# Patient Record
Sex: FEMALE | Race: WHITE | Employment: FULL TIME | URBAN - METROPOLITAN AREA
[De-identification: names, ages, dates, MRNs, and addresses within clinical notes are randomized per-mention and may not be internally consistent; named-entity substitution may affect disease eponyms.]

---

## 2017-01-03 ENCOUNTER — PRENATAL OFFICE VISIT (OUTPATIENT)
Dept: FAMILY MEDICINE | Facility: CLINIC | Age: 37
End: 2017-01-03
Payer: COMMERCIAL

## 2017-01-03 VITALS
HEART RATE: 72 BPM | SYSTOLIC BLOOD PRESSURE: 118 MMHG | HEIGHT: 66 IN | TEMPERATURE: 97.8 F | BODY MASS INDEX: 28.12 KG/M2 | DIASTOLIC BLOOD PRESSURE: 68 MMHG | WEIGHT: 175 LBS

## 2017-01-03 DIAGNOSIS — Z34.83 PRENATAL CARE, SUBSEQUENT PREGNANCY, THIRD TRIMESTER: Primary | ICD-10-CM

## 2017-01-03 PROCEDURE — 99207 ZZC PRENATAL VISIT: CPT | Performed by: FAMILY MEDICINE

## 2017-01-03 PROCEDURE — 87653 STREP B DNA AMP PROBE: CPT | Mod: 90 | Performed by: FAMILY MEDICINE

## 2017-01-03 PROCEDURE — 99000 SPECIMEN HANDLING OFFICE-LAB: CPT | Performed by: FAMILY MEDICINE

## 2017-01-03 NOTE — PROGRESS NOTES
Doing well with no concerns other than noting slight ankle swelling at the end of the day. No vision changes no abd pain and no headaches  No ctx no LOF she feels good. On valtrex and no labial lesions or sxs. Reviewed when to go to labor and delivery. GBS done today. Will see OB next visit.

## 2017-01-03 NOTE — MR AVS SNAPSHOT
After Visit Summary   1/3/2017    Judi Wayne    MRN: [id number]           Patient Information     Date Of Birth          1980        Visit Information        Provider Department      1/3/2017 9:20 AM Vanessa Mackay MD [facility]        Care Instructions    Recheck in one week with Dr Valle     Go to labor and delivery with any signs of labor or leakage of fluid         Follow-ups after your visit        Your next 10 appointments already scheduled     Jan 12, 2017  1:30 PM   ESTABLISHED PRENATAL with Thania Valle MD   [facility] ([facility])    [address] [place] [address]   [phone]              Who to contact     If you have questions or need follow up information about today's clinic visit or your schedule please contact [facility] directly at [phone].  Normal or non-critical lab and imaging results will be communicated to you by MyChart, letter or phone within 4 business days after the clinic has received the results. If you do not hear from us within 7 days, please contact the clinic through [employer] or phone. If you have a critical or abnormal lab result, we will notify you by phone as soon as possible.  Submit refill requests through [employer] or call your pharmacy and they will forward the refill request to us. Please allow 3 business days for your refill to be completed.          Additional Information About Your Visit        MyChart Information     [employer] gives you secure access to your electronic health record. If you see a primary care provider, you can also send messages to your care team and make appointments. If you have questions, please call your primary care clinic.  If you do not have a primary care provider, please call [phone] and they will assist you.        Care EveryWhere ID     This is your Care EveryWhere ID. This could be used by other organizations to  "access your [facility] medical records  [id number]        Your Vitals Were     Pulse Temperature Height BMI (Body Mass Index) Last Period       72 97.8  F (36.6  C) (Tympanic) 5' 6\" (1.676 m) 28.26 kg/m2 04/29/2016        Blood Pressure from Last 3 Encounters:   01/03/17 118/68   12/19/16 102/58   12/07/16 125/89    Weight from Last 3 Encounters:   01/03/17 175 lb (79.379 kg)   12/19/16 170 lb 6.4 oz (77.293 kg)   12/07/16 167 lb (75.751 kg)              Today, you had the following     No orders found for display       Primary Care Provider Office Phone # Fax #    Vanessa Mackay [phone]       [facility] [address] [facility] [address]        Thank you!     Thank you for choosing [facility]  for your care. Our goal is always to provide you with excellent care. Hearing back from our patients is one way we can continue to improve our services. Please take a few minutes to complete the written survey that you may receive in the mail after your visit with us. Thank you!             Your Updated Medication List - Protect others around you: Learn how to safely use, store and throw away your medicines at www.disposemymeds.org.          This list is accurate as of: 1/3/17 10:02 AM.  Always use your most recent med list.                   Brand Name Dispense Instructions for use    COLACE PO          ferrous sulfate 325 (65 FE) MG Tbec EC tablet     30 tablet    Take 1 tablet (325 mg) by mouth daily       prenatal multivitamin  plus iron 27-0.8 MG Tabs per tablet      Take 1 tablet by mouth daily       valACYclovir 500 MG tablet    VALTREX    90 tablet    Take 1 tablet (500 mg) by mouth daily         "

## 2017-01-03 NOTE — PATIENT INSTRUCTIONS
Recheck in one week with Dr Valle     Go to labor and delivery with any signs of labor or leakage of fluid

## 2017-01-04 LAB
GP B STREP DNA SPEC QL NAA+PROBE: NORMAL
SPECIMEN SOURCE: NORMAL

## 2017-01-12 ENCOUNTER — MYC MEDICAL ADVICE (OUTPATIENT)
Dept: FAMILY MEDICINE | Facility: CLINIC | Age: 37
End: 2017-01-12

## 2017-01-13 ENCOUNTER — TELEPHONE (OUTPATIENT)
Dept: FAMILY MEDICINE | Facility: CLINIC | Age: 37
End: 2017-01-13

## 2017-01-13 ENCOUNTER — PRENATAL OFFICE VISIT (OUTPATIENT)
Dept: OBGYN | Facility: CLINIC | Age: 37
End: 2017-01-13
Payer: COMMERCIAL

## 2017-01-13 VITALS
HEIGHT: 66 IN | WEIGHT: 176 LBS | DIASTOLIC BLOOD PRESSURE: 84 MMHG | BODY MASS INDEX: 28.28 KG/M2 | HEART RATE: 74 BPM | SYSTOLIC BLOOD PRESSURE: 131 MMHG

## 2017-01-13 DIAGNOSIS — Z34.83 PRENATAL CARE, SUBSEQUENT PREGNANCY, THIRD TRIMESTER: Primary | ICD-10-CM

## 2017-01-13 PROBLEM — Z28.39 RUBELLA NON-IMMUNE STATUS, ANTEPARTUM: Status: ACTIVE | Noted: 2017-01-13

## 2017-01-13 PROBLEM — O09.899 RUBELLA NON-IMMUNE STATUS, ANTEPARTUM: Status: ACTIVE | Noted: 2017-01-13

## 2017-01-13 PROCEDURE — 99207 ZZC PRENATAL VISIT: CPT | Performed by: OBSTETRICS & GYNECOLOGY

## 2017-01-13 NOTE — NURSING NOTE
"Chief Complaint   Patient presents with     Prenatal Care       Initial /84 mmHg  Pulse 74  Ht 5' 6\" (1.676 m)  Wt 176 lb (79.833 kg)  BMI 28.42 kg/m2  LMP 04/29/2016  Breastfeeding? No Estimated body mass index is 28.42 kg/(m^2) as calculated from the following:    Height as of this encounter: 5' 6\" (1.676 m).    Weight as of this encounter: 176 lb (79.833 kg).  BP completed using cuff size: tala Mederos      "

## 2017-01-13 NOTE — PROGRESS NOTES
"Doing well.   C/o of hip discomfort intermittent and mild contractions. Tolerable for her.   Denies VB, LOF. +FM  /84 mmHg  Pulse 74  Ht 5' 6\" (1.676 m)  Wt 176 lb (79.833 kg)  BMI 28.42 kg/m2  LMP 2016  Breastfeeding? No  General Appearance: NAD  Abdomen: Gravid, NT  Refer to flow sheet above.   A/P: 36 year old  at 37w0d  -- reviewed GBS negative status  -- Discussed with Judi Wayne, the following; indications; the agents and methods of labor augmentation, including risks, benefits, and alternative approaches; and the possible need for  birth. EFW is SGA. The Labor Induction:what you need to know information sheet was made available to her. Questions and concerns were addressed and patient agrees to above if necessary during the course of her labor.  -- labor precautions reviewed  RTC in 1 week    Rashaad Marshall MD  [facility]            "

## 2017-01-19 ENCOUNTER — PRENATAL OFFICE VISIT (OUTPATIENT)
Dept: OBGYN | Facility: CLINIC | Age: 37
End: 2017-01-19
Payer: COMMERCIAL

## 2017-01-19 VITALS
DIASTOLIC BLOOD PRESSURE: 66 MMHG | SYSTOLIC BLOOD PRESSURE: 119 MMHG | BODY MASS INDEX: 28.77 KG/M2 | HEIGHT: 66 IN | HEART RATE: 77 BPM | WEIGHT: 179 LBS

## 2017-01-19 DIAGNOSIS — O09.899 RUBELLA NON-IMMUNE STATUS, ANTEPARTUM: ICD-10-CM

## 2017-01-19 DIAGNOSIS — Z34.83 PRENATAL CARE, SUBSEQUENT PREGNANCY, THIRD TRIMESTER: Primary | ICD-10-CM

## 2017-01-19 DIAGNOSIS — Z28.39 RUBELLA NON-IMMUNE STATUS, ANTEPARTUM: ICD-10-CM

## 2017-01-19 PROCEDURE — 99207 ZZC PRENATAL VISIT: CPT | Performed by: OBSTETRICS & GYNECOLOGY

## 2017-01-19 NOTE — PROGRESS NOTES
"Doing well.   Reports occasional ctx. Uncertain about elective induction of labor as her  is against it.    Denies VB, LOF. +FM  /66 mmHg  Pulse 77  Ht 5' 6\" (1.676 m)  Wt 179 lb (81.194 kg)  BMI 28.91 kg/m2  LMP 2016  Breastfeeding? No  General Appearance: NAD  Abdomen: Gravid, NT  Refer to flow sheet above.   A/P: 36 year old  at 37w6d  -- GBS negative status reviewed  -- letter provided regarding delaying her short-term disability until delivery  -- Discussed with Judi Wayne, the following; indications; the agents and methods of labor augmentation, including risks, benefits, and alternative approaches; and the possible need for  birth. EFW is AGA. The Labor Induction:what you need to know information sheet was made available to her. Questions and concerns were addressed and patient agrees to above if necessary during the course of her labor.  -- labor precautions reviewed  RTC in 1 week    Rashaad Marshall MD  [facility]            "

## 2017-01-19 NOTE — NURSING NOTE
"Chief Complaint   Patient presents with     Prenatal Care       Initial /66 mmHg  Pulse 77  Ht 5' 6\" (1.676 m)  Wt 179 lb (81.194 kg)  BMI 28.91 kg/m2  LMP 04/29/2016  Breastfeeding? No Estimated body mass index is 28.91 kg/(m^2) as calculated from the following:    Height as of this encounter: 5' 6\" (1.676 m).    Weight as of this encounter: 179 lb (81.194 kg).  BP completed using cuff size: regular  Emily Mederos      "

## 2017-01-19 NOTE — Clinical Note
[facility]  [address] [facility] [address]  Phone: [phone]    January 19, 2017    Judi HOOD Tone  [address] [place] [id number]              To whom it may concern;      Judi would like to postpone the start of her short-term disability while she is still able to work.            Sincerely,      Rashaad Marshall MD/ dl

## 2017-01-23 ENCOUNTER — HOSPITAL ENCOUNTER (INPATIENT)
Facility: CLINIC | Age: 37
LOS: 1 days | Discharge: HOME | End: 2017-01-24
Attending: OBSTETRICS & GYNECOLOGY | Admitting: OBSTETRICS & GYNECOLOGY
Payer: COMMERCIAL

## 2017-01-23 ENCOUNTER — ANESTHESIA EVENT (OUTPATIENT)
Dept: OBGYN | Facility: CLINIC | Age: 37
End: 2017-01-23
Payer: COMMERCIAL

## 2017-01-23 ENCOUNTER — ANESTHESIA (OUTPATIENT)
Dept: OBGYN | Facility: CLINIC | Age: 37
End: 2017-01-23
Payer: COMMERCIAL

## 2017-01-23 LAB
ABO + RH BLD: NORMAL
ABO + RH BLD: NORMAL
SPECIMEN EXP DATE BLD: NORMAL
T PALLIDUM IGG+IGM SER QL: NEGATIVE

## 2017-01-23 PROCEDURE — 25000125 ZZHC RX 250: Performed by: NURSE ANESTHETIST, CERTIFIED REGISTERED

## 2017-01-23 PROCEDURE — 86901 BLOOD TYPING SEROLOGIC RH(D): CPT | Performed by: OBSTETRICS & GYNECOLOGY

## 2017-01-23 PROCEDURE — 25000128 H RX IP 250 OP 636: Performed by: NURSE ANESTHETIST, CERTIFIED REGISTERED

## 2017-01-23 PROCEDURE — 37000011 ZZH ANESTHESIA WARD SERVICE: Performed by: NURSE ANESTHETIST, CERTIFIED REGISTERED

## 2017-01-23 PROCEDURE — 72200001 ZZH LABOR CARE VAGINAL DELIVERY SINGLE

## 2017-01-23 PROCEDURE — 3E0R3CZ INTRODUCTION OF REGIONAL ANESTHETIC INTO SPINAL CANAL, PERCUTANEOUS APPROACH: ICD-10-PCS | Performed by: OBSTETRICS & GYNECOLOGY

## 2017-01-23 PROCEDURE — 86780 TREPONEMA PALLIDUM: CPT | Performed by: OBSTETRICS & GYNECOLOGY

## 2017-01-23 PROCEDURE — 25000132 ZZH RX MED GY IP 250 OP 250 PS 637: Performed by: OBSTETRICS & GYNECOLOGY

## 2017-01-23 PROCEDURE — 86900 BLOOD TYPING SEROLOGIC ABO: CPT | Performed by: OBSTETRICS & GYNECOLOGY

## 2017-01-23 PROCEDURE — 40000671 ZZH STATISTIC ANESTHESIA CASE

## 2017-01-23 PROCEDURE — 00HU33Z INSERTION OF INFUSION DEVICE INTO SPINAL CANAL, PERCUTANEOUS APPROACH: ICD-10-PCS | Performed by: OBSTETRICS & GYNECOLOGY

## 2017-01-23 PROCEDURE — 25800025 ZZH RX 258: Performed by: OBSTETRICS & GYNECOLOGY

## 2017-01-23 PROCEDURE — 25000125 ZZHC RX 250: Performed by: OBSTETRICS & GYNECOLOGY

## 2017-01-23 PROCEDURE — 59400 OBSTETRICAL CARE: CPT | Performed by: OBSTETRICS & GYNECOLOGY

## 2017-01-23 PROCEDURE — 10907ZC DRAINAGE OF AMNIOTIC FLUID, THERAPEUTIC FROM PRODUCTS OF CONCEPTION, VIA NATURAL OR ARTIFICIAL OPENING: ICD-10-PCS | Performed by: OBSTETRICS & GYNECOLOGY

## 2017-01-23 PROCEDURE — 12000027 ZZH R&B OB

## 2017-01-23 RX ORDER — OXYTOCIN/0.9 % SODIUM CHLORIDE 30/500 ML
100 PLASTIC BAG, INJECTION (ML) INTRAVENOUS CONTINUOUS
Status: DISCONTINUED | OUTPATIENT
Start: 2017-01-23 | End: 2017-01-24 | Stop reason: HOSPADM

## 2017-01-23 RX ORDER — METHYLERGONOVINE MALEATE 0.2 MG/ML
200 INJECTION INTRAVENOUS
Status: DISCONTINUED | OUTPATIENT
Start: 2017-01-23 | End: 2017-01-23

## 2017-01-23 RX ORDER — NALOXONE HYDROCHLORIDE 0.4 MG/ML
.1-.4 INJECTION, SOLUTION INTRAMUSCULAR; INTRAVENOUS; SUBCUTANEOUS
Status: DISCONTINUED | OUTPATIENT
Start: 2017-01-23 | End: 2017-01-24 | Stop reason: HOSPADM

## 2017-01-23 RX ORDER — EPHEDRINE SULFATE 50 MG/ML
5 INJECTION, SOLUTION INTRAMUSCULAR; INTRAVENOUS; SUBCUTANEOUS
Status: DISCONTINUED | OUTPATIENT
Start: 2017-01-23 | End: 2017-01-24 | Stop reason: HOSPADM

## 2017-01-23 RX ORDER — OXYTOCIN/0.9 % SODIUM CHLORIDE 30/500 ML
100-340 PLASTIC BAG, INJECTION (ML) INTRAVENOUS CONTINUOUS PRN
Status: COMPLETED | OUTPATIENT
Start: 2017-01-23 | End: 2017-01-23

## 2017-01-23 RX ORDER — BISACODYL 10 MG
10 SUPPOSITORY, RECTAL RECTAL DAILY PRN
Status: DISCONTINUED | OUTPATIENT
Start: 2017-01-25 | End: 2017-01-24 | Stop reason: HOSPADM

## 2017-01-23 RX ORDER — FENTANYL CITRATE 50 UG/ML
INJECTION, SOLUTION INTRAMUSCULAR; INTRAVENOUS PRN
Status: DISCONTINUED | OUTPATIENT
Start: 2017-01-23 | End: 2017-01-23

## 2017-01-23 RX ORDER — HYDROCODONE BITARTRATE AND ACETAMINOPHEN 5; 325 MG/1; MG/1
1-2 TABLET ORAL EVERY 4 HOURS PRN
Status: DISCONTINUED | OUTPATIENT
Start: 2017-01-23 | End: 2017-01-24 | Stop reason: HOSPADM

## 2017-01-23 RX ORDER — ONDANSETRON 2 MG/ML
4 INJECTION INTRAMUSCULAR; INTRAVENOUS EVERY 6 HOURS PRN
Status: DISCONTINUED | OUTPATIENT
Start: 2017-01-23 | End: 2017-01-23 | Stop reason: CLARIF

## 2017-01-23 RX ORDER — SODIUM CHLORIDE, SODIUM LACTATE, POTASSIUM CHLORIDE, CALCIUM CHLORIDE 600; 310; 30; 20 MG/100ML; MG/100ML; MG/100ML; MG/100ML
INJECTION, SOLUTION INTRAVENOUS CONTINUOUS
Status: DISCONTINUED | OUTPATIENT
Start: 2017-01-23 | End: 2017-01-23 | Stop reason: CLARIF

## 2017-01-23 RX ORDER — IBUPROFEN 400 MG/1
400-800 TABLET, FILM COATED ORAL EVERY 6 HOURS PRN
Status: DISCONTINUED | OUTPATIENT
Start: 2017-01-23 | End: 2017-01-24 | Stop reason: HOSPADM

## 2017-01-23 RX ORDER — OXYTOCIN/0.9 % SODIUM CHLORIDE 30/500 ML
340 PLASTIC BAG, INJECTION (ML) INTRAVENOUS CONTINUOUS PRN
Status: DISCONTINUED | OUTPATIENT
Start: 2017-01-23 | End: 2017-01-24 | Stop reason: HOSPADM

## 2017-01-23 RX ORDER — PRENATAL VIT/IRON FUM/FOLIC AC 27MG-0.8MG
1 TABLET ORAL DAILY
Status: DISCONTINUED | OUTPATIENT
Start: 2017-01-24 | End: 2017-01-24 | Stop reason: HOSPADM

## 2017-01-23 RX ORDER — LANOLIN 100 %
OINTMENT (GRAM) TOPICAL
Status: DISCONTINUED | OUTPATIENT
Start: 2017-01-23 | End: 2017-01-24 | Stop reason: HOSPADM

## 2017-01-23 RX ORDER — LIDOCAINE HYDROCHLORIDE 10 MG/ML
INJECTION, SOLUTION EPIDURAL; INFILTRATION; INTRACAUDAL; PERINEURAL
Status: DISCONTINUED
Start: 2017-01-23 | End: 2017-01-23 | Stop reason: HOSPADM

## 2017-01-23 RX ORDER — ONDANSETRON 2 MG/ML
4 INJECTION INTRAMUSCULAR; INTRAVENOUS EVERY 6 HOURS PRN
Status: DISCONTINUED | OUTPATIENT
Start: 2017-01-23 | End: 2017-01-23

## 2017-01-23 RX ORDER — LIDOCAINE HYDROCHLORIDE 10 MG/ML
INJECTION, SOLUTION INFILTRATION; PERINEURAL PRN
Status: DISCONTINUED | OUTPATIENT
Start: 2017-01-23 | End: 2017-01-23

## 2017-01-23 RX ORDER — LIDOCAINE HYDROCHLORIDE AND EPINEPHRINE 15; 5 MG/ML; UG/ML
INJECTION, SOLUTION EPIDURAL PRN
Status: DISCONTINUED | OUTPATIENT
Start: 2017-01-23 | End: 2017-01-23

## 2017-01-23 RX ORDER — HYDROCORTISONE 2.5 %
CREAM (GRAM) TOPICAL 3 TIMES DAILY PRN
Status: DISCONTINUED | OUTPATIENT
Start: 2017-01-23 | End: 2017-01-24 | Stop reason: HOSPADM

## 2017-01-23 RX ORDER — NALOXONE HYDROCHLORIDE 0.4 MG/ML
.1-.4 INJECTION, SOLUTION INTRAMUSCULAR; INTRAVENOUS; SUBCUTANEOUS
Status: DISCONTINUED | OUTPATIENT
Start: 2017-01-23 | End: 2017-01-23

## 2017-01-23 RX ORDER — MISOPROSTOL 200 UG/1
400 TABLET ORAL
Status: DISCONTINUED | OUTPATIENT
Start: 2017-01-23 | End: 2017-01-24 | Stop reason: HOSPADM

## 2017-01-23 RX ORDER — FENTANYL CITRATE 50 UG/ML
INJECTION, SOLUTION INTRAMUSCULAR; INTRAVENOUS
Status: DISCONTINUED
Start: 2017-01-23 | End: 2017-01-23 | Stop reason: HOSPADM

## 2017-01-23 RX ORDER — SODIUM CHLORIDE, SODIUM LACTATE, POTASSIUM CHLORIDE, CALCIUM CHLORIDE 600; 310; 30; 20 MG/100ML; MG/100ML; MG/100ML; MG/100ML
INJECTION, SOLUTION INTRAVENOUS CONTINUOUS
Status: DISCONTINUED | OUTPATIENT
Start: 2017-01-23 | End: 2017-01-23

## 2017-01-23 RX ORDER — OXYTOCIN 10 [USP'U]/ML
10 INJECTION, SOLUTION INTRAMUSCULAR; INTRAVENOUS
Status: DISCONTINUED | OUTPATIENT
Start: 2017-01-23 | End: 2017-01-24 | Stop reason: HOSPADM

## 2017-01-23 RX ORDER — ONDANSETRON 2 MG/ML
4 INJECTION INTRAMUSCULAR; INTRAVENOUS EVERY 6 HOURS PRN
Status: DISCONTINUED | OUTPATIENT
Start: 2017-01-23 | End: 2017-01-24 | Stop reason: HOSPADM

## 2017-01-23 RX ORDER — ACETAMINOPHEN 325 MG/1
650 TABLET ORAL EVERY 4 HOURS PRN
Status: DISCONTINUED | OUTPATIENT
Start: 2017-01-23 | End: 2017-01-23

## 2017-01-23 RX ORDER — AMOXICILLIN 250 MG
1-2 CAPSULE ORAL 2 TIMES DAILY
Status: DISCONTINUED | OUTPATIENT
Start: 2017-01-23 | End: 2017-01-24 | Stop reason: HOSPADM

## 2017-01-23 RX ORDER — CARBOPROST TROMETHAMINE 250 UG/ML
250 INJECTION, SOLUTION INTRAMUSCULAR
Status: DISCONTINUED | OUTPATIENT
Start: 2017-01-23 | End: 2017-01-23

## 2017-01-23 RX ORDER — ROPIVACAINE HYDROCHLORIDE 2 MG/ML
INJECTION, SOLUTION EPIDURAL; INFILTRATION; PERINEURAL PRN
Status: DISCONTINUED | OUTPATIENT
Start: 2017-01-23 | End: 2017-01-23

## 2017-01-23 RX ORDER — ONDANSETRON 4 MG/1
4 TABLET, ORALLY DISINTEGRATING ORAL EVERY 6 HOURS PRN
Status: DISCONTINUED | OUTPATIENT
Start: 2017-01-23 | End: 2017-01-24 | Stop reason: HOSPADM

## 2017-01-23 RX ORDER — OXYCODONE AND ACETAMINOPHEN 5; 325 MG/1; MG/1
1 TABLET ORAL
Status: DISCONTINUED | OUTPATIENT
Start: 2017-01-23 | End: 2017-01-23

## 2017-01-23 RX ORDER — ACETAMINOPHEN 325 MG/1
650 TABLET ORAL EVERY 4 HOURS PRN
Status: DISCONTINUED | OUTPATIENT
Start: 2017-01-23 | End: 2017-01-24 | Stop reason: HOSPADM

## 2017-01-23 RX ORDER — DIPHENHYDRAMINE HYDROCHLORIDE 50 MG/ML
12.5-25 INJECTION INTRAMUSCULAR; INTRAVENOUS EVERY 4 HOURS PRN
Status: DISCONTINUED | OUTPATIENT
Start: 2017-01-23 | End: 2017-01-24 | Stop reason: HOSPADM

## 2017-01-23 RX ORDER — OXYTOCIN 10 [USP'U]/ML
10 INJECTION, SOLUTION INTRAMUSCULAR; INTRAVENOUS
Status: DISCONTINUED | OUTPATIENT
Start: 2017-01-23 | End: 2017-01-23

## 2017-01-23 RX ORDER — ROPIVACAINE HYDROCHLORIDE 2 MG/ML
10 INJECTION, SOLUTION EPIDURAL; INFILTRATION; PERINEURAL ONCE
Status: DISCONTINUED | OUTPATIENT
Start: 2017-01-23 | End: 2017-01-24 | Stop reason: HOSPADM

## 2017-01-23 RX ORDER — LIDOCAINE HCL/EPINEPHRINE/PF 2%-1:200K
VIAL (ML) INJECTION PRN
Status: DISCONTINUED | OUTPATIENT
Start: 2017-01-23 | End: 2017-01-23

## 2017-01-23 RX ORDER — IBUPROFEN 800 MG/1
800 TABLET, FILM COATED ORAL
Status: COMPLETED | OUTPATIENT
Start: 2017-01-23 | End: 2017-01-23

## 2017-01-23 RX ADMIN — Medication 10 ML/HR: at 08:26

## 2017-01-23 RX ADMIN — Medication 10 ML: at 08:26

## 2017-01-23 RX ADMIN — SODIUM CHLORIDE, POTASSIUM CHLORIDE, SODIUM LACTATE AND CALCIUM CHLORIDE 1000 ML: 600; 310; 30; 20 INJECTION, SOLUTION INTRAVENOUS at 09:00

## 2017-01-23 RX ADMIN — SODIUM CHLORIDE, POTASSIUM CHLORIDE, SODIUM LACTATE AND CALCIUM CHLORIDE 1000 ML: 600; 310; 30; 20 INJECTION, SOLUTION INTRAVENOUS at 07:40

## 2017-01-23 RX ADMIN — SODIUM BICARBONATE 0.8 MEQ: 84 INJECTION, SOLUTION INTRAVENOUS at 08:08

## 2017-01-23 RX ADMIN — OXYTOCIN-SODIUM CHLORIDE 0.9% IV SOLN 30 UNIT/500ML 340 ML/HR: 30-0.9/5 SOLUTION at 10:42

## 2017-01-23 RX ADMIN — LIDOCAINE HYDROCHLORIDE,EPINEPHRINE BITARTRATE 45 MG: 15; .005 INJECTION, SOLUTION EPIDURAL; INFILTRATION; INTRACAUDAL; PERINEURAL at 08:20

## 2017-01-23 RX ADMIN — LIDOCAINE HYDROCHLORIDE,EPINEPHRINE BITARTRATE 10 ML: 20; .005 INJECTION, SOLUTION EPIDURAL; INFILTRATION; INTRACAUDAL; PERINEURAL at 09:39

## 2017-01-23 RX ADMIN — IBUPROFEN 800 MG: 800 TABLET ORAL at 19:04

## 2017-01-23 RX ADMIN — LIDOCAINE HYDROCHLORIDE 80 MG: 10 INJECTION, SOLUTION INFILTRATION; PERINEURAL at 08:08

## 2017-01-23 RX ADMIN — FENTANYL CITRATE 100 MCG: 50 INJECTION, SOLUTION INTRAMUSCULAR; INTRAVENOUS at 08:26

## 2017-01-23 ASSESSMENT — LIFESTYLE VARIABLES: TOBACCO_USE: 1

## 2017-01-23 NOTE — L&D DELIVERY NOTE
"Delivery Summary    36 year old  38w3d presents for active labor  GBS negative status  Cx on admission 4/100/-1  S/p epidural  AROM at 0910,  with blood clear fluid  Category 2 tracing with deep variable to 70 from baseline of 130-140s, moderate variability  Progressed to complete at      at 1040,   Viable female infant, delivered  Weight 6#1oz  Apgar 9 and 9 at 1 and 5 minutes, respectively  Placenta delivered intact, 3 vessel cord  No lacerations  QBL pending  \"\"    Rashaad Marshall MD  [facility]      Judi Wayne MRN# [id number]   Age: 36 year old YOB: 1980     Labor Event Times:    Labor Onset Date 2017       Labor Onset Time 4:00 AM    Dilation Complete Date 2017    Dilation Complete Time 10:29 AM       Start Pushing Date         Start Pushing Time             Labor Length:    1st Stage (hrs/min) 6.00  29.00    2nd Stage (hrs/min) 0.00  11.00    3rd Stage (hrs/min)             Labor Events:     Labor No    Indicator      Rupture Date 2017    Rupture Time 9:10 am    Rupture Type Artificial rupture of membranes [5]    Fluid Color Clear;Bloody    Labor Type Spontaneous    Induction      Induction Indication          Augmentation      Labor Complications      Additional Complications      Management of Labor         Antibiotics      IV Antibiotic Given      Additional Management      Fetal Status Prior to  Delivery Category 2    Fetal Status Comments          Cervical Ripening:    Date       Time       Type           Delivery:    Episiotomy None    Local Anesthetic         Lacerations None    Sponge Count Correct        Needle Count Correct      Final Count by: CHULA Millard    Sutures      Vaginal delivery est. blood loss (mL      Surgical or additional est. blood loss (mL)      Combined est. blood loss (mL):      Packing Intentionally Left In            Information for the patient's :  Tone, Baby1 " Judi [[id number]]       Delivery  2017 10:40 AM by  Vaginal, Spontaneous Delivery  Sex:  female Gestational Age: 38w3d  Delivery Clinician:  Rashaad Marshall  Living?: Yes          APGARS  One minute Five minutes Ten minutes   Skin color: 0   1        Heart rate: 2   2        Grimace: 2   2        Muscle tone: 2   2        Breathin   2        Totals: 8  9         Presentation/position:           Resuscitation and Interventions: Method:  None  Oxygen Type:     Intubation Time:   # of Attempts:     ETT Size:        Tracheal Suction:     Tracheal returns:       Care at Delivery:           Cord information: 3 Vessels   Disposition of cord blood: Lab    Blood gases sent? No  Complications: Nuchal   Placenta: Delivered: 2017 10:43 AM  Spontaneous    appearance.  Comments:  .  Disposition: Hospital disposal  [place] Measurements:  Weight:    Height:    Head circumference:    Chest circumference:     Temperature:     Other providers:   Yumiko Oliveros;Elma Pagan   Additional  information:  Forceps:    Verbal Informed Consent Obtained:       Alternative Labor Strategies Discussed:     Emergency Resources Available:       Type:       Accrued Pulling Time:       # of Pulls:      Position:     Fetal Station:       Indications:      Other Indications:     Operative Vaginal Delivery Brief Note Forceps:        Vacuum:    Verbal Informed Consent Obtained:     Alternative Labor Strategies Discussed:     Emergency Resources Available:     Type:      Accrued Pulling Time:       # of Pop-Offs:       # of Pulls:       Position:     Fetal Station:      Indications for Vacuum:       Other Indications:    Operative Vaginal Delivery Brief Note Vacuum:        Shoulder Dystocia Shoulder Dystocia    Fetal Tracing Prior to Delivery:  Category 2   Shoulder dystocia present?:  No                                            Breech:       : Type:     Indications for Primary:     Indications for  Secondary:     Other Indications:        Observed anomalies     Output in Delivery Room: Voided

## 2017-01-23 NOTE — ANESTHESIA POSTPROCEDURE EVALUATION
Patient: Judi Wayne    LABOR EPIDURAL  Additional Information* No procedures listed *    Diagnosis:* No pre-op diagnosis entered *  Diagnosis Additional Information: No value filed.    Anesthesia Type:  Epidural    Note:  Anesthesia Post Evaluation    Patient location during evaluation: Floor  Patient participation: Able to fully participate in evaluation  Level of consciousness: awake and alert  Pain management: adequate  Airway patency: patent  Cardiovascular status: stable  Respiratory status: room air  Hydration status: stable  PONV: none     Anesthetic complications: None          Last vitals:  Filed Vitals:    01/23/17 1200 01/23/17 1215 01/23/17 1230   BP: 132/79  133/74   Pulse:      Temp:      Resp:      SpO2: 95% 97% 98%       Electronically Signed By: RORY Montes CRNA  January 23, 2017  2:40 PM
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
## 2017-01-23 NOTE — ANESTHESIA CARE TRANSFER NOTE
Patient: Judi Wayne    LABOR EPIDURAL  Additional Information* No procedures listed *    Diagnosis: * No pre-op diagnosis entered *  Diagnosis Additional Information: No value filed.    Anesthesia Type:   Epidural     Note:  Airway :Room Air  Patient transferred to:Labor and Delivery        Vitals: (Last set prior to Anesthesia Care Transfer)              Electronically Signed By: RORY Montes CRNA  January 23, 2017  2:39 PM

## 2017-01-23 NOTE — ANESTHESIA PREPROCEDURE EVALUATION
Anesthesia Evaluation       history and physical reviewed . Pt has had prior anesthetic.     No history of anesthetic complications     ROS/MED HX    ENT/Pulmonary:  - neg pulmonary ROS   (+)tobacco use, Current use 1/2 packs/day  , . .    Neurologic:  - neg neurologic ROS     Cardiovascular:  - neg cardiovascular ROS       METS/Exercise Tolerance:  >4 METS   Hematologic:  - neg hematologic  ROS       Musculoskeletal:  - neg musculoskeletal ROS       GI/Hepatic:  - neg GI/hepatic ROS       Renal/Genitourinary:  - ROS Renal section negative       Endo:  - neg endo ROS       Psychiatric:  - neg psychiatric ROS       Infectious Disease:  - neg infectious disease ROS       Malignancy:      - no malignancy   Other: Comment: HSV   (+) Possibly pregnant              Physical Exam  Normal systems: cardiovascular, pulmonary and dental    Airway   Mallampati: I  TM distance: >3 FB  Neck ROM: full    Dental     Cardiovascular       Pulmonary     Other findings: Nose piercing      neg OB ROS                 Anesthesia Plan      History & Physical Review  History and physical reviewed and following examination; no interval change.    ASA Status:  2 .  OB Epidural Asa: 2       Plan for Epidural   PONV prophylaxis:  Ondansetron (or other 5HT-3), Dexamethasone or Solumedrol and Scopolamine patch       Postoperative Care  Postoperative pain management:  IV analgesics and Oral pain medications.      Consents  Anesthetic plan, risks, benefits and alternatives discussed with:  Patient and Patient..                          .

## 2017-01-23 NOTE — PLAN OF CARE
Problem: Goal Outcome Summary  Goal: Goal Outcome Summary  Outcome: Improving  Pt assessment wnl, vss.  Pt up with stand by assist to bathroom.  Pt able to void without difficulty.  Pt steady on feet and RN advised ok to ambulated independently.  Pt encouraged to ambulate in room and in cruz.  OK to tub when desires.  Plan:  Will continue to monitor and assess.  S/L IV removed, no longer indicated.

## 2017-01-23 NOTE — IP AVS SNAPSHOT
MRN:[id number]                      After Visit Summary   1/23/2017    Judi Wayne    MRN: [id number]           Thank you!     Thank you for choosing [facility] for your care. Our goal is always to provide you with excellent care. Hearing back from our patients is one way we can continue to improve our services. Please take a few minutes to complete the written survey that you may receive in the mail after you visit with us. Thank you!        Patient Information     Date Of Birth          1980        About your hospital stay     You were admitted on:  January 23, 2017 You last received care in the:  [facility]    You were discharged on:  January 24, 2017       Who to Call     For medical emergencies, please call 911.  For non-urgent questions about your medical care, please call your primary care provider or clinic, [phone]          Attending Provider     Provider    Isha Kerr MD Abdullahi, Isahaq Mohamed, MD       Primary Care Provider Office Phone # Fax #    aVnessa Mackay [phone]       [address] [address]        After Care Instructions     Activity       Review discharge instructions            Diet       Resume previous diet            Discharge Instructions - Gestational diabetic patients       Gestational diabetic patients to follow up for fasting blood sugar and 2 hour 75gm glucose load at 6 weeks postpartum.            Discharge Instructions - Postpartum visit       Schedule postpartum visit with your provider and return to clinic in 6 weeks.    Your activity upon discharge: Activity as tolerated  No lifting restrictions  No driving for 2 weeks or no driving while on narcotic pain medications  Nothing in the vagina including sex, douching or tampons for 6 weeks    Call  if you have any of the following:  Temperature > 100.4  Foul smelling vaginal discharge  Bleeding > 1 pad per  hour x 2 hrs,   Pain not controlled by oral pain meds  Severe constipation or severe nausea or vomiting.                  Further instructions from your care team            Discharge Instructions and Follow-Up:    Discharge diet:  Regular    Discharge activity:  Activity as tolerated    Discharge follow-up:  Follow up with primary care provider in 6 weeks    Wound care:  Drink plenty of fluids                 Postpartum Vaginal Delivery Instructions    Activity       Ask family and friends for help when you need it.    Do not place anything in your vagina for 6 weeks.    You are not restricted on other activities, but take it easy for a few weeks to allow your body to recover from delivery.  You are able to do any activities you feel up to that point.    No driving until you have stopped taking your pain medications (usually two weeks after delivery).     Call your health care provider if you have any of these symptoms:       Increased pain, swelling, redness, or fluid around your stiches from an episiotomy or perineal tear.    A fever above 100.4 F (38 C) with or without chills when placing a thermometer under your tongue.    You soak a sanitary pad with blood within 1 hour, or you see blood clots larger than a golf ball.    Bleeding that lasts more than 6 weeks.    Vaginal discharge that smells bad.    Severe pain, cramping or tenderness in your lower belly area.    A need to urinate more frequently (use the toilet more often), more urgently (use the toilet very quickly), or it burns when you urinate.    Nausea and vomiting.    Redness, swelling or pain around a vein in your leg.    Problems breastfeeding or a red or painful area on your breast.    Chest pain and cough or are gasping for air.    Problems coping with sadness, anxiety, or depression.  If you have any concerns about hurting yourself or the baby, call your provider immediately.     You have questions or concerns after you return home.     Keep your  hands clean:  Always wash your hands before touching your perineal area and stitches.  This helps reduce your risk of infection.  If your hands aren't dirty, you may use an alcohol hand-rub to clean your hands. Keep your nails clean and short.        Pending Results     No orders found for last 2 day(s).            Statement of Approval     Ordered          17 0752  I have reviewed and agree with all the recommendations and orders detailed in this document.   EFFECTIVE NOW     Approved and electronically signed by:  Rashaad Marshall MD             Admission Information        Provider Department Dept Phone    2017 Rashaad Marshall MD [facility] [phone]      Your Vitals Were     Blood Pressure Pulse Temperature Respirations Pulse Oximetry Last Period    113/62 mmHg 61 98.7  F (37.1  C) (Oral) 16 98% 2016      [employer] Information     [employer] gives you secure access to your electronic health record. If you see a primary care provider, you can also send messages to your care team and make appointments. If you have questions, please call your primary care clinic.  If you do not have a primary care provider, please call [phone] and they will assist you.        Care EveryWhere ID     This is your Care EveryWhere ID. This could be used by other organizations to access your [facility] medical records  [id number]           Review of your medicines      START taking        Dose / Directions    HYDROcodone-acetaminophen 5-325 MG per tablet   Commonly known as:  NORCO   Used for:   (spontaneous vaginal delivery)        Dose:  1 tablet   Take 1 tablet by mouth every 6 hours as needed   Quantity:  10 tablet   Refills:  0       ibuprofen 600 MG tablet   Commonly known as:  ADVIL/MOTRIN   Used for:   (spontaneous vaginal delivery)        Dose:  600 mg   Take 1 tablet (600 mg) by mouth every 6 hours as needed for moderate pain   Quantity:  90 tablet   Refills:  1        senna-docusate 8.6-50 MG per tablet   Commonly known as:  SENOKOT-S;PERICOLACE   Used for:   (spontaneous vaginal delivery)        Dose:  1 tablet   Take 1 tablet by mouth 2 times daily   Quantity:  60 tablet   Refills:  1         CONTINUE these medicines which have NOT CHANGED        Dose / Directions    ferrous sulfate 325 (65 FE) MG Tbec EC tablet        Dose:  325 mg   Take 1 tablet (325 mg) by mouth daily   Quantity:  30 tablet   Refills:  0       prenatal multivitamin  plus iron 27-0.8 MG Tabs per tablet        Dose:  1 tablet   Take 1 tablet by mouth daily   Refills:  0         STOP taking     COLACE PO           valACYclovir 500 MG tablet   Commonly known as:  VALTREX                Where to get your medicines      These medications were sent to [facility] - [place], [place] - [address]  [address] [address]     Phone:  [phone]    - ibuprofen 600 MG tablet  - senna-docusate 8.6-50 MG per tablet      Some of these will need a paper prescription and others can be bought over the counter. Ask your nurse if you have questions.     Bring a paper prescription for each of these medications    - HYDROcodone-acetaminophen 5-325 MG per tablet             Protect others around you: Learn how to safely use, store and throw away your medicines at www.disposemymeds.org.             Medication List: This is a list of all your medications and when to take them. Check marks below indicate your daily home schedule. Keep this list as a reference.      Medications           Morning Afternoon Evening Bedtime As Needed    ferrous sulfate 325 (65 FE) MG Tbec EC tablet   Take 1 tablet (325 mg) by mouth daily                                HYDROcodone-acetaminophen 5-325 MG per tablet   Commonly known as:  NORCO   Take 1 tablet by mouth every 6 hours as needed                                ibuprofen 600 MG tablet   Commonly known as:  ADVIL/MOTRIN   Take 1 tablet (600 mg) by mouth every 6  hours as needed for moderate pain   Last time this was given:  800 mg on 1/23/2017  7:04 PM                                prenatal multivitamin  plus iron 27-0.8 MG Tabs per tablet   Take 1 tablet by mouth daily   Last time this was given:  1 tablet on 1/24/2017  8:54 AM                                senna-docusate 8.6-50 MG per tablet   Commonly known as:  SENOKOT-S;PERICOLACE   Take 1 tablet by mouth 2 times daily   Last time this was given:  1 tablet on 1/24/2017  8:55 AM

## 2017-01-23 NOTE — PLAN OF CARE
Problem: Labor (Cervical Ripen, Induct, Augment) (Adult,Obstetrics,Pediatric)  Goal: Signs and Symptoms of Listed Potential Problems Will be Absent or Manageable (Labor)  Signs and symptoms of listed potential problems will be absent or manageable by discharge/transition of care (reference Labor (Cervical Ripen, Induct, Augment) (Adult,Obstetrics,Pediatric) CPG).  Outcome: Improving  Multip presents to Birth center at 0640 with c/o regular & painful contractions since 04. Oriented to room and procedures. FOB present and supportive. VSS and category FHR tracing obtained. Hx of HSV, reports taking Valtrex as ordered and denies any symptoms of lesions. SVE done as noted. Denies LOF but postive for mod amt of bloody show upon exam. Requesting epidural for pain control. MD o/c call paged, awaiting return phone call to inform of admission and receive orders.  Admitted to L & D. Plan to place IV and give LR bolus in preparation for epidural after informed consent obtained. Report to MENG Pagan RN, who will assure care of pt.

## 2017-01-23 NOTE — PLAN OF CARE
Problem: Postpartum, Vaginal Delivery (Adult)  Goal: Signs and Symptoms of Listed Potential Problems Will be Absent or Manageable (Postpartum, Vaginal Delivery)  Signs and symptoms of listed potential problems will be absent or manageable by discharge/transition of care (reference Postpartum, Vaginal Delivery (Adult) CPG).   Outcome: Improving  Pt up to the bathroom to void.  Assist of 2 because her knees buckled under her she walked to the bathroom.  Voided a large amount.  Used Keyla steady to move pt into 2044.  Oriented to room, call light, and POC.  Pt verbalized understanding.   Pt is still a fall.  Reminded her to call when she needed to get up again.

## 2017-01-23 NOTE — TELEPHONE ENCOUNTER
Reason for Call:  Form, our goal is to have forms completed with 72 hours, however, some forms may require a visit or additional information.    Type of letter, form or note:  disability    Who is the form from?: [employer]    Where did the form come from: form was faxed in/interofficed to us    What clinic location was the form placed at?: [facility]    Where the form was placed: [facility] - filled out and given to Dr. Neil to sign    What number is listed as a contact on the form?: [phone]       Additional comments: will fax back to [employer]    Call taken on 1/23/2017 at 3:01 PM by Luisana Jimenez

## 2017-01-23 NOTE — PROVIDER NOTIFICATION
01/23/17 1440   Total Blood Loss   Total QBL Blood Loss (mL) -481 mL   This amount was not calculated prperly, please ignore, unable to delete

## 2017-01-23 NOTE — IP AVS SNAPSHOT
[facility]    [address] [address]    Phone:  [phone]    Fax:  [phone]                                       After Visit Summary   1/23/2017    Judi Wayne    MRN: [id number]           After Visit Summary Signature Page     I have received my discharge instructions, and my questions have been answered. I have discussed any challenges I see with this plan with the nurse or doctor.    ..........................................................................................................................................  Patient/Patient Representative Signature      ..........................................................................................................................................  Patient Representative Print Name and Relationship to Patient    ..................................................               ................................................  Date                                            Time    ..........................................................................................................................................  Reviewed by Signature/Title    ...................................................              ..............................................  Date                                                            Time

## 2017-01-23 NOTE — PLAN OF CARE
Problem: Labor (Cervical Ripen, Induct, Augment) (Adult,Obstetrics,Pediatric)  Goal: Signs and Symptoms of Listed Potential Problems Will be Absent or Manageable (Labor)  Signs and symptoms of listed potential problems will be absent or manageable by discharge/transition of care (reference Labor (Cervical Ripen, Induct, Augment) (Adult,Obstetrics,Pediatric) CPG).   Outcome: Declining  FHT's continue to have variable decels with every contraction.  Position changes and O2 did not make any difference.  Pt had 1 prolonged deceleration ranging from 50-90's x 3 minute.  FSS reactive.  Baseline return to 125.  Good variability through out.  Complete at 1030.   +1 to +2 station.  Will start to push.

## 2017-01-23 NOTE — PLAN OF CARE
Problem: Labor (Cervical Ripen, Induct, Augment) (Adult,Obstetrics,Pediatric)  Goal: Signs and Symptoms of Listed Potential Problems Will be Absent or Manageable (Labor)  Signs and symptoms of listed potential problems will be absent or manageable by discharge/transition of care (reference Labor (Cervical Ripen, Induct, Augment) (Adult,Obstetrics,Pediatric) CPG).   Outcome: Completed Date Met:  17   after only 2 pushes with Dr Marshall present.  Intact perineum.  Mom wishes to breast feed.  Baby skin to skin after delayed cord clamping.  Baby voided on mom.  Continue to monitor.

## 2017-01-23 NOTE — ANESTHESIA PROCEDURE NOTES
Peripheral nerve/Neuraxial procedure note : epidural catheter  Pre-Procedure  Performed by  CESAR PHILLIPS   Location: OB    Procedure Times:1/23/2017 8:02 AM and 1/23/2017 8:18 AM  Pre-Anesthestic Checklist: patient identified, IV checked, risks and benefits discussed, informed consent, monitors and equipment checked, pre-op evaluation and at physician/surgeon's request    Timeout  Correct Patient: Yes   Correct Procedure: Yes   Correct Site: Yes   Correct Laterality: N/A   Correct Position: Yes   Site Marked: N/A   .   Procedure Documentation  ASA 2  Diagnosis:Pain.    Procedure:    Epidural catheter.  Insertion Site:L3-4  (midline approach) Injection technique: LORT saline   Local skin infiltrated with 8 mL of 1% lidocaine.       Patient Prep;mask, sterile gloves, patient draped.  .  Needle: Touhy needle (17 G. 3.5 in). # of attempts: 2. # of redirects:.  Spinal Needle: . . . Catheter: 19 G .  .  .  Catheter threaded easily at the skin and 4 cm in the epidural space.     Assessment/Narrative  Paresthesias: No.  .  .  Aspiration negative for heme or CSF  . Test dose of 3 mL lidocaine 1.5% w/ 1:200,000 epinephrine at 08:20.  Test dose negative for signs of intravascular, subdural or intrathecal injection. Comments:  VAS pain score prior to epidural:10/10    VAS pain score after epidural:    Pt. Tolerated well, FHR stable.

## 2017-01-24 VITALS
DIASTOLIC BLOOD PRESSURE: 62 MMHG | HEART RATE: 61 BPM | RESPIRATION RATE: 16 BRPM | SYSTOLIC BLOOD PRESSURE: 113 MMHG | OXYGEN SATURATION: 98 % | TEMPERATURE: 98.7 F

## 2017-01-24 PROCEDURE — 90707 MMR VACCINE SC: CPT | Performed by: OBSTETRICS & GYNECOLOGY

## 2017-01-24 PROCEDURE — 25000132 ZZH RX MED GY IP 250 OP 250 PS 637: Performed by: OBSTETRICS & GYNECOLOGY

## 2017-01-24 PROCEDURE — 25000125 ZZHC RX 250: Performed by: OBSTETRICS & GYNECOLOGY

## 2017-01-24 RX ORDER — HYDROCODONE BITARTRATE AND ACETAMINOPHEN 5; 325 MG/1; MG/1
1 TABLET ORAL EVERY 6 HOURS PRN
Qty: 10 TABLET | Refills: 0 | Status: SHIPPED | OUTPATIENT
Start: 2017-01-24 | End: 2017-03-06

## 2017-01-24 RX ORDER — AMOXICILLIN 250 MG
1 CAPSULE ORAL 2 TIMES DAILY
Qty: 60 TABLET | Refills: 1 | Status: SHIPPED | OUTPATIENT
Start: 2017-01-24 | End: 2017-05-08

## 2017-01-24 RX ORDER — IBUPROFEN 600 MG/1
600 TABLET, FILM COATED ORAL EVERY 6 HOURS PRN
Qty: 90 TABLET | Refills: 1 | Status: SHIPPED | OUTPATIENT
Start: 2017-01-24 | End: 2017-03-06

## 2017-01-24 RX ADMIN — SENNOSIDES AND DOCUSATE SODIUM 1 TABLET: 8.6; 5 TABLET ORAL at 08:55

## 2017-01-24 RX ADMIN — MEASLES, MUMPS, AND RUBELLA VIRUS VACCINE LIVE 0.5 ML: 1000; 12500; 1000 INJECTION, POWDER, LYOPHILIZED, FOR SUSPENSION SUBCUTANEOUS at 11:14

## 2017-01-24 RX ADMIN — PRENATAL VIT W/ FE FUMARATE-FA TAB 27-0.8 MG 1 TABLET: 27-0.8 TAB at 08:54

## 2017-01-24 NOTE — PROGRESS NOTES
Patient discharged per ambulatory with infant in car seat today at 1320. Mother verified that her band matches her infant's band by comparing the infant's  MR#.  Discharge instructions given. Encouraged to call for any problems, questions or concerns. RXs to be picked up at discharge.

## 2017-01-24 NOTE — TELEPHONE ENCOUNTER
Paper work rec'd on 1-12-17 was filled out and faxed.  Sent to be scanned.  Copy put up front.    -Luisana Jimenez  [facility] [place]

## 2017-01-24 NOTE — PROGRESS NOTES
PPD#1  Doing well. Pain well controlled on oral pain medication. Tolerating regular diet. Voiding well. Ambulating without difficulty. Lochia is scant. Breast feeding.   Filed Vitals:    17 1215 17 1230 17 1515 17 0300   BP:  133/74 127/81 109/67   Pulse:   57 62   Temp:   98.7  F (37.1  C) 98  F (36.7  C)   TempSrc:   Oral    Resp:   16 16   SpO2: 97% 98%       General Appearance: NAD  Abdomen: Soft, NT, ND. Fundus firm at U-2  Extremities: NT, trace edema    HEMOGLOBIN   Date Value Ref Range Status   2016 11.8 11.7 - 15.7 g/dL Final   10/10/2016 10.8* 11.7 - 15.7 g/dL Final   ]    A/P: 36 year old  PPD#1 s/p . Hemodynamically stable.   -- discharge precautions, expectations reviewed  -- discharge home today    Rashaad Marshall MD  [facility]

## 2017-01-24 NOTE — PROGRESS NOTES
Pt up in cruz and off unit to ambulate.  Pt independent with self and  cares.  Pt possibly desires to discharge after 24 hours.  Education complete, parents decline additional teaching.

## 2017-01-24 NOTE — DISCHARGE SUMMARY
[facility] Discharge Summary    Judi Wayne MRN# [id number]   Age: 36 year old YOB: 1980     Date of Admission:  2017  Date of Discharge::  2017  Admitting Physician:  Isha Kerr MD  Discharge Physician:  Rashaad Marshall MD     Home clinic: [facility]          Admission Diagnoses:   Intrauterine pregnancy at 38w3d  GBS negative status  Active labor          Discharge Diagnosis:   Viable female infant, delivered  S/p           Procedures:   Procedure(s): Spontaneous vaginal delivery       No other procedures performed during this admission           Medications Prior to Admission:     Prescriptions prior to admission   Medication Sig Dispense Refill Last Dose     ferrous sulfate 325 (65 FE) MG TBEC Take 1 tablet (325 mg) by mouth daily 30 tablet  2017 at 2130     Prenatal Vit-Fe Fumarate-FA (PRENATAL MULTIVITAMIN  PLUS IRON) 27-0.8 MG TABS Take 1 tablet by mouth daily   2017 at 2130     [DISCONTINUED] Docusate Sodium (COLACE PO) Take 100 mg by mouth every evening    2017 at 2130     [DISCONTINUED] valACYclovir (VALTREX) 500 MG tablet Take 1 tablet (500 mg) by mouth daily 90 tablet 0 2017 at 2130             Discharge Medications:     Current Discharge Medication List      START taking these medications    Details   ibuprofen (ADVIL/MOTRIN) 600 MG tablet Take 1 tablet (600 mg) by mouth every 6 hours as needed for moderate pain  Qty: 90 tablet, Refills: 1    Associated Diagnoses:  (spontaneous vaginal delivery)      HYDROcodone-acetaminophen (NORCO) 5-325 MG per tablet Take 1 tablet by mouth every 6 hours as needed  Qty: 10 tablet, Refills: 0    Associated Diagnoses:  (spontaneous vaginal delivery)      senna-docusate (SENOKOT-S;PERICOLACE) 8.6-50 MG per tablet Take 1 tablet by mouth 2 times daily  Qty: 60 tablet, Refills: 1    Associated Diagnoses:  (spontaneous vaginal delivery)         CONTINUE these medications which  "have NOT CHANGED    Details   ferrous sulfate 325 (65 FE) MG TBEC Take 1 tablet (325 mg) by mouth daily  Qty: 30 tablet      Prenatal Vit-Fe Fumarate-FA (PRENATAL MULTIVITAMIN  PLUS IRON) 27-0.8 MG TABS Take 1 tablet by mouth daily         STOP taking these medications       Docusate Sodium (COLACE PO) Comments:   Reason for Stopping:         valACYclovir (VALTREX) 500 MG tablet Comments:   Reason for Stopping:                     Consultations:   No consultations were requested during this admission          Brief History of Labor:   Judi Wayne is a 36 year old  at 38w3d by LMP c/w 8w1d US, here worsening contractions that started 0400, . She states that she is feeling well today.  She denies fever, HA, blurred vision, Nausea, vomiting, CP, SOB, constipation, diarrhea, vaginal bleeding, LOF, abnormal vaginal discharge, and acute swelling.  +FM.             Hospital Course:   36 year old  38w3d presents for active labor  GBS negative status  Cx on admission 4/100/-1  S/p epidural  AROM at 0910,  with blood clear fluid  Category 2 tracing with deep variable to 70 from baseline of 130-140s, moderate variability  Progressed to complete at      at 1040,   Viable female infant, delivered  Weight 6#1oz  Apgar 9 and 9 at 1 and 5 minutes, respectively  Placenta delivered intact, 3 vessel cord  No lacerations  QBL pending  \"\"    The patient's hospital course was unremarkable.  On discharge, her pain was well controlled. Vaginal bleeding is similar to peak menstrual flow.  Voiding without difficulty.  Ambulating well and tolerating a normal diet.  No fever.  Breastfeeding well.  Infant is stable.  No bowel movement yet.*  She was discharged on post-partum day #1.    Post-partum hemoglobin:   HEMOGLOBIN   Date Value Ref Range Status   2016 11.8 11.7 - 15.7 g/dL Final   10/10/2016 10.8* 11.7 - 15.7 g/dL Final           Discharge Instructions and Follow-Up:   Discharge diet: Regular "   Discharge activity: Activity as tolerated   Discharge follow-up: Follow up with primary care provider in 6 weeks   Wound care: Drink plenty of fluids           Discharge Disposition:   Discharged to home      Attestation:  I have reviewed today's vital signs, notes, medications, labs and imaging.    Rashaad Marshall MD   [facility]

## 2017-01-24 NOTE — PLAN OF CARE
Problem: Postpartum, Vaginal Delivery (Adult)  Goal: Signs and Symptoms of Listed Potential Problems Will be Absent or Manageable (Postpartum, Vaginal Delivery)  Signs and symptoms of listed potential problems will be absent or manageable by discharge/transition of care (reference Postpartum, Vaginal Delivery (Adult) CPG).   Outcome: Therapy, progress toward functional goals as expected  Data: Vital signs within normal limits. Postpartum checks within normal limits - see flow record. Patient eating and drinking normally. Patient able to empty bladder independently. . Patient ambulating independently..   No apparent signs of infection. perineum healing well. Patient is performing self cares and is able to care for infant. Positive attachment behaviors are observed with infant. Support persons are present.  Action: Patient medicated during the shift for cramping. See MAR.Patient education done about breastfeeding,  cares, postpartum cares and  safety. See flow record.  Response:   Patient reassessed within 1 hour after each medication and pain. Patient stated that pain had improved. Patient stated that she was comfortable. .   Plan: Anticipate discharge on 2017 per patient request after  24 hour screening.

## 2017-01-24 NOTE — DISCHARGE INSTRUCTIONS
Discharge Instructions and Follow-Up:    Discharge diet:  Regular    Discharge activity:  Activity as tolerated    Discharge follow-up:  Follow up with primary care provider in 6 weeks    Wound care:  Drink plenty of fluids                 Postpartum Vaginal Delivery Instructions    Activity       Ask family and friends for help when you need it.    Do not place anything in your vagina for 6 weeks.    You are not restricted on other activities, but take it easy for a few weeks to allow your body to recover from delivery.  You are able to do any activities you feel up to that point.    No driving until you have stopped taking your pain medications (usually two weeks after delivery).     Call your health care provider if you have any of these symptoms:       Increased pain, swelling, redness, or fluid around your stiches from an episiotomy or perineal tear.    A fever above 100.4 F (38 C) with or without chills when placing a thermometer under your tongue.    You soak a sanitary pad with blood within 1 hour, or you see blood clots larger than a golf ball.    Bleeding that lasts more than 6 weeks.    Vaginal discharge that smells bad.    Severe pain, cramping or tenderness in your lower belly area.    A need to urinate more frequently (use the toilet more often), more urgently (use the toilet very quickly), or it burns when you urinate.    Nausea and vomiting.    Redness, swelling or pain around a vein in your leg.    Problems breastfeeding or a red or painful area on your breast.    Chest pain and cough or are gasping for air.    Problems coping with sadness, anxiety, or depression.  If you have any concerns about hurting yourself or the baby, call your provider immediately.     You have questions or concerns after you return home.     Keep your hands clean:  Always wash your hands before touching your perineal area and stitches.  This helps reduce your risk of infection.  If your hands aren't dirty, you may use  an alcohol hand-rub to clean your hands. Keep your nails clean and short.

## 2017-03-06 ENCOUNTER — OFFICE VISIT (OUTPATIENT)
Dept: FAMILY MEDICINE | Facility: CLINIC | Age: 37
End: 2017-03-06
Payer: COMMERCIAL

## 2017-03-06 VITALS
DIASTOLIC BLOOD PRESSURE: 84 MMHG | WEIGHT: 159.6 LBS | BODY MASS INDEX: 25.65 KG/M2 | TEMPERATURE: 97.2 F | SYSTOLIC BLOOD PRESSURE: 102 MMHG | HEIGHT: 66 IN | HEART RATE: 76 BPM

## 2017-03-06 DIAGNOSIS — B07.8 COMMON WART: ICD-10-CM

## 2017-03-06 DIAGNOSIS — L91.8 SKIN TAG: ICD-10-CM

## 2017-03-06 PROBLEM — O09.899 RUBELLA NON-IMMUNE STATUS, ANTEPARTUM: Status: RESOLVED | Noted: 2017-01-13 | Resolved: 2017-03-06

## 2017-03-06 PROBLEM — Z28.39 RUBELLA NON-IMMUNE STATUS, ANTEPARTUM: Status: RESOLVED | Noted: 2017-01-13 | Resolved: 2017-03-06

## 2017-03-06 PROCEDURE — 99207 ZZC POST PARTUM EXAM: CPT | Performed by: FAMILY MEDICINE

## 2017-03-06 PROCEDURE — 17110 DESTRUCTION B9 LES UP TO 14: CPT | Performed by: FAMILY MEDICINE

## 2017-03-06 ASSESSMENT — ANXIETY QUESTIONNAIRES
3. WORRYING TOO MUCH ABOUT DIFFERENT THINGS: NOT AT ALL
2. NOT BEING ABLE TO STOP OR CONTROL WORRYING: NOT AT ALL
6. BECOMING EASILY ANNOYED OR IRRITABLE: NOT AT ALL
1. FEELING NERVOUS, ANXIOUS, OR ON EDGE: NOT AT ALL
5. BEING SO RESTLESS THAT IT IS HARD TO SIT STILL: NOT AT ALL
GAD7 TOTAL SCORE: 0
7. FEELING AFRAID AS IF SOMETHING AWFUL MIGHT HAPPEN: NOT AT ALL

## 2017-03-06 ASSESSMENT — PATIENT HEALTH QUESTIONNAIRE - PHQ9: 5. POOR APPETITE OR OVEREATING: NOT AT ALL

## 2017-03-06 NOTE — PROGRESS NOTES
SUBJECTIVE: Judi is here for a 6-week postpartum checkup.    Date of Last Pap:    Lab Results   Component Value Date    PAP NIL 2015         Delivery date was 2017. She had a  of a viable girl, weight 6 pounds 1 oz., with no complications.  Since delivery, she has been breast feeding.  She has no signs of infection, bleeding or other complications.  We discussed contraceptions and she has chosen vasectomy.  She  has not had intercourse since delivery and complains of mild discomfort. Patient screened for postpartum depression and complaints are NEGATIVE. Screening has also been completed for intimate partner violence.        Pt also has wart on the right hand, 2 that are getting bigger and a skin tag on the back of the neck that is getting caught on clothes and is now painful. All of these skin things got bigger during preg        Problem list and histories reviewed & adjusted, as indicated.  Additional history: as documented    Labs reviewed in EPIC    Reviewed and updated as needed this visit by clinical staff  Tobacco  Allergies  Meds  Problems  Med Hx  Surg Hx  Fam Hx  Soc Hx        Reviewed and updated as needed this visit by Provider  Allergies  Meds  Problems            EXAM:    GENERAL APPEARANCE: healthy, alert and no distress     PSYCH: mentation appears normal. and affect normal/bright  Skin: Warts on finger right hand, 2  Pared using a 15 blade and cryotherapy in a freeze thaw cycle time two. patient tolerated the procedure. Basic wound care instructions given.     Skin tag on neck treated with cryotherapy x2     (Z39.2) Routine postpartum follow-up  (primary encounter diagnosis)  Comment:   Plan:     (B07.8) Common wart  Comment:   Plan: DESTRUCT BENIGN LESION, UP TO 14            (L91.8) Skin tag  Comment:   Plan: DESTRUCT BENIGN LESION, UP TO 14              Basic wound care reviewed    Continue on PNV     Risks, benefits, side effects and rationale for treatment plan  fully discussed with the patient and understanding expressed.     Vanessa Mackay MD

## 2017-03-06 NOTE — MR AVS SNAPSHOT
"              After Visit Summary   3/6/2017    Judi Wayne    MRN: [id number]           Patient Information     Date Of Birth          1980        Visit Information        Provider Department      3/6/2017 10:20 AM Vanessa Mackay MD [facility]        Today's Diagnoses     Routine postpartum follow-up    -  1    Common wart        Skin tag           Follow-ups after your visit        Who to contact     If you have questions or need follow up information about today's clinic visit or your schedule please contact [facility] directly at [phone].  Normal or non-critical lab and imaging results will be communicated to you by Sviralhart, letter or phone within 4 business days after the clinic has received the results. If you do not hear from us within 7 days, please contact the clinic through Sviralhart or phone. If you have a critical or abnormal lab result, we will notify you by phone as soon as possible.  Submit refill requests through [employer] or call your pharmacy and they will forward the refill request to us. Please allow 3 business days for your refill to be completed.          Additional Information About Your Visit        MyChart Information     [employer] gives you secure access to your electronic health record. If you see a primary care provider, you can also send messages to your care team and make appointments. If you have questions, please call your primary care clinic.  If you do not have a primary care provider, please call [phone] and they will assist you.        Care EveryWhere ID     This is your Care EveryWhere ID. This could be used by other organizations to access your [facility] medical records  [id number]        Your Vitals Were     Pulse Temperature Height Last Period Breastfeeding? BMI (Body Mass Index)    76 97.2  F (36.2  C) (Tympanic) 5' 6\" (1.676 m) 04/29/2016 Yes 25.76 kg/m2       Blood Pressure from Last 3 Encounters:   03/06/17 102/84 "   01/24/17 113/62   01/19/17 119/66    Weight from Last 3 Encounters:   03/06/17 159 lb 9.6 oz (72.4 kg)   01/19/17 179 lb (81.2 kg)   01/13/17 176 lb (79.8 kg)              We Performed the Following     DESTRUCT BENIGN LESION, UP TO 14        Primary Care Provider Office Phone # Fax #    Vanessa Mackay [phone]       [facility] [address] [address]        Thank you!     Thank you for choosing [facility]  for your care. Our goal is always to provide you with excellent care. Hearing back from our patients is one way we can continue to improve our services. Please take a few minutes to complete the written survey that you may receive in the mail after your visit with us. Thank you!             Your Updated Medication List - Protect others around you: Learn how to safely use, store and throw away your medicines at www.disposemymeds.org.          This list is accurate as of: 3/6/17 11:59 PM.  Always use your most recent med list.                   Brand Name Dispense Instructions for use    ferrous sulfate 325 (65 FE) MG Tbec EC tablet     30 tablet    Take 1 tablet (325 mg) by mouth daily       prenatal multivitamin  plus iron 27-0.8 MG Tabs per tablet      Take 1 tablet by mouth daily       senna-docusate 8.6-50 MG per tablet    SENOKOT-S;PERICOLACE    60 tablet    Take 1 tablet by mouth 2 times daily

## 2017-03-06 NOTE — NURSING NOTE
"Chief Complaint   Patient presents with     Post Partum Exam       Initial /84 (BP Location: Right arm, Patient Position: Chair, Cuff Size: Adult Large)  Pulse 76  Temp 97.2  F (36.2  C) (Tympanic)  Ht 5' 6\" (1.676 m)  Wt 159 lb 9.6 oz (72.4 kg)  LMP 04/29/2016  Breastfeeding? Yes  BMI 25.76 kg/m2 Estimated body mass index is 25.76 kg/(m^2) as calculated from the following:    Height as of this encounter: 5' 6\" (1.676 m).    Weight as of this encounter: 159 lb 9.6 oz (72.4 kg).  Medication Reconciliation: complete        "

## 2017-03-07 ASSESSMENT — PATIENT HEALTH QUESTIONNAIRE - PHQ9: SUM OF ALL RESPONSES TO PHQ QUESTIONS 1-9: 1

## 2017-03-07 ASSESSMENT — ANXIETY QUESTIONNAIRES: GAD7 TOTAL SCORE: 0

## 2017-05-08 ENCOUNTER — OFFICE VISIT (OUTPATIENT)
Dept: FAMILY MEDICINE | Facility: CLINIC | Age: 37
End: 2017-05-08
Payer: COMMERCIAL

## 2017-05-08 VITALS
TEMPERATURE: 98.5 F | SYSTOLIC BLOOD PRESSURE: 120 MMHG | WEIGHT: 159.6 LBS | BODY MASS INDEX: 25.76 KG/M2 | DIASTOLIC BLOOD PRESSURE: 62 MMHG | OXYGEN SATURATION: 97 % | HEART RATE: 89 BPM | RESPIRATION RATE: 18 BRPM

## 2017-05-08 DIAGNOSIS — M62.830 SPASM OF BACK MUSCLES: Primary | ICD-10-CM

## 2017-05-08 PROCEDURE — 99213 OFFICE O/P EST LOW 20 MIN: CPT | Performed by: NURSE PRACTITIONER

## 2017-05-08 RX ORDER — METHOCARBAMOL 500 MG/1
1000 TABLET, FILM COATED ORAL 2 TIMES DAILY PRN
Qty: 30 TABLET | Refills: 1 | Status: SHIPPED | OUTPATIENT
Start: 2017-05-08 | End: 2018-03-13

## 2017-05-08 ASSESSMENT — PAIN SCALES - GENERAL: PAINLEVEL: EXTREME PAIN (8)

## 2017-05-08 NOTE — NURSING NOTE
"Chief Complaint   Patient presents with     Back Pain       Initial /62 (BP Location: Left arm, Patient Position: Chair, Cuff Size: Adult Regular)  Pulse 89  Temp 98.5  F (36.9  C) (Tympanic)  Resp 18  Wt 159 lb 9.6 oz (72.4 kg)  SpO2 97%  Breastfeeding? Yes  BMI 25.76 kg/m2 Estimated body mass index is 25.76 kg/(m^2) as calculated from the following:    Height as of 3/6/17: 5' 6\" (1.676 m).    Weight as of this encounter: 159 lb 9.6 oz (72.4 kg).  Medication Reconciliation: complete  "

## 2017-05-08 NOTE — PATIENT INSTRUCTIONS
You can take the Robaxin twice daily as needed. Take after you have best fed.     Apply ice to the low back for 20 minutes several times a day.     Take Ibuprofen or Tylenol between doses of Robaxin. Consider having an MRI of the back done.     Schedule your MRI at the number below.

## 2017-05-08 NOTE — PROGRESS NOTES
SUBJECTIVE:                                                    Judi Wayne is a 37 year old female who presents to clinic today for the following health issues:      Back Pain      Duration: last few days but today is worse        Specific cause: none    Description:   Location of pain: low back both  Character of pain: sharp, and spasm  Pain radiation:none  New numbness or weakness in legs, not attributed to pain:  YES- legs feel weak    Intensity: Currently 8/10    History:   Pain interferes with job: YES  History of back problems: back injury age 16  Any previous MRI or X-rays: None  Sees a specialist for back pain:  No  Therapies tried without relief:cetaminophen (Tylenol), chiropractor, cold, heat and NSAIDs    Patient had baby 3.5 months ago  Is breastfeeding  Last episode of back pain was 7.5 years ago  Today chiropractor could only do minimal adjustment due to spasms    Alleviating factors:     Improved by: sitting and having legs up    Precipitating factors:  Worsened by: Standing and Walking    Functional and Psychosocial Screen (Kiana STarT Back):      Not performed today       Accompanying Signs & Symptoms:  Risk of Fracture:  None  Risk of Cauda Equina:  None  Risk of Infection:  None  Risk of Cancer:  None  Risk of Ankylosing Spondylitis:  Onset at age <35, male, AND morning back stiffness. no                  - Does not recall recent back injury.  Remote history when she was in her teens.   - Excruciating pain just started this morning.   - has significant muscle spasming in the low back  - pain centered in low back, has not gone down legs.  Legs do feel tired and achy  - feels the need to move her legs almost continuously.  Compares this to RLS  - In the past seeing chiro helped to resolve pain and get back to normal. Remembers chiropractor telling her that some time that she had a bulging disc noted on XR       Problem list and histories reviewed & adjusted, as indicated.  Additional history:  as documented    Reviewed and updated as needed this visit by clinical staff       Reviewed and updated as needed this visit by Provider         ROS:  Constitutional, HEENT, cardiovascular, pulmonary, GI, , musculoskeletal, neuro, skin, endocrine and psych systems are negative, except as otherwise noted.    OBJECTIVE:                                                    /62 (BP Location: Left arm, Patient Position: Chair, Cuff Size: Adult Regular)  Pulse 89  Temp 98.5  F (36.9  C) (Tympanic)  Resp 18  Wt 159 lb 9.6 oz (72.4 kg)  SpO2 97%  Breastfeeding? Yes  BMI 25.76 kg/m2  Body mass index is 25.76 kg/(m^2).  GENERAL: healthy, alert and no distress  NECK: no adenopathy, no asymmetry, masses, or scars and thyroid normal to palpation  RESP: lungs clear to auscultation - no rales, rhonchi or wheezes  CV: regular rate and rhythm, normal S1 S2, no S3 or S4, no murmur, click or rub, no peripheral edema and peripheral pulses strong  ABDOMEN: soft, nontender, no hepatosplenomegaly, no masses and bowel sounds normal  BACK: no CVA tenderness, paralumbar tenderness noted in lumbar region.  Muscles in low back are very tight.    Diagnostic Test Results:  none     ASSESSMENT/PLAN:                                                        1. Spasms and pain of low back muscles  I think an MRI is appropriate given recurrence of symptoms. Patient is not so sure that she once to get an MRI.  Likely, she will wait to see how she responds to the muscle relaxants over the next several days. Also discussed physical therapy, ice and use of Tylenol or ibuprofen  - methocarbamol (ROBAXIN) 500 MG tablet; Take 2 tablets (1,000 mg) by mouth 2 times daily as needed for muscle spasms  Dispense: 30 tablet; Refill: 1  - MR Lumbar Spine w/o Contrast; Future    Patient Instructions   You can take the Robaxin twice daily as needed. Take after you have best fed.     Apply ice to the low back for 20 minutes several times a day.     Take  Ibuprofen or Tylenol between doses of Robaxin. Consider having an MRI of the back done.     Schedule your MRI at the number below.      Bruna Morales NP, APRN [facility]

## 2017-05-08 NOTE — MR AVS SNAPSHOT
After Visit Summary   5/8/2017    Judi Wayne    MRN: [id number]           Patient Information     Date Of Birth          1980        Visit Information        Provider Department      5/8/2017 3:20 PM Bruna Morales APRN CNP [facility]        Today's Diagnoses     Spasm of back muscles    -  1      Care Instructions    You can take the Robaxin twice daily as needed. Take after you have best fed.     Apply ice to the low back for 20 minutes several times a day.     Take Ibuprofen or Tylenol between doses of Robaxin. Consider having an MRI of the back done.     Schedule your MRI at the number below.        Follow-ups after your visit        Future tests that were ordered for you today     Open Future Orders        Priority Expected Expires Ordered    MR Lumbar Spine w/o Contrast Routine  5/8/2018 5/8/2017            Who to contact     If you have questions or need follow up information about today's clinic visit or your schedule please contact [facility] directly at [phone].  Normal or non-critical lab and imaging results will be communicated to you by SkySpecshart, letter or phone within 4 business days after the clinic has received the results. If you do not hear from us within 7 days, please contact the clinic through Fieldwire or phone. If you have a critical or abnormal lab result, we will notify you by phone as soon as possible.  Submit refill requests through [employer] or call your pharmacy and they will forward the refill request to us. Please allow 3 business days for your refill to be completed.          Additional Information About Your Visit        SkySpecshart Information     [employer] gives you secure access to your electronic health record. If you see a primary care provider, you can also send messages to your care team and make appointments. If you have questions, please call your primary care clinic.  If you do not have a primary care provider, please  call [phone] and they will assist you.        Care EveryWhere ID     This is your Care EveryWhere ID. This could be used by other organizations to access your [place] medical records  [id number]        Your Vitals Were     Pulse Temperature Respirations Pulse Oximetry Breastfeeding? BMI (Body Mass Index)    89 98.5  F (36.9  C) (Tympanic) 18 97% Yes 25.76 kg/m2       Blood Pressure from Last 3 Encounters:   05/08/17 120/62   03/06/17 102/84   01/24/17 113/62    Weight from Last 3 Encounters:   05/08/17 159 lb 9.6 oz (72.4 kg)   03/06/17 159 lb 9.6 oz (72.4 kg)   01/19/17 179 lb (81.2 kg)                 Today's Medication Changes          These changes are accurate as of: 5/8/17  4:07 PM.  If you have any questions, ask your nurse or doctor.               Start taking these medicines.        Dose/Directions    methocarbamol 500 MG tablet   Commonly known as:  ROBAXIN   Used for:  Spasm of back muscles   Started by:  Bruna Morales APRN CNP        Dose:  1000 mg   Take 2 tablets (1,000 mg) by mouth 2 times daily as needed for muscle spasms   Quantity:  30 tablet   Refills:  1         Stop taking these medicines if you haven't already. Please contact your care team if you have questions.     ferrous sulfate 325 (65 FE) MG Tbec EC tablet   Stopped by:  Bruna Morales APRN CNP           senna-docusate 8.6-50 MG per tablet   Commonly known as:  SENOKOT-S;PERICOLACE   Stopped by:  Bruna Morales APRN CNP                Where to get your medicines      These medications were sent to [facility] [place] - [address]  [facility] [address], [facility] [address]     Phone:  [phone]     methocarbamol 500 MG tablet                Primary Care Provider Office Phone # Fax #    Vanessa Mackay [phone]       [facility] [address] [facility] [address]        Thank you!     Thank you for choosing [facility]  for your  care. Our goal is always to provide you with excellent care. Hearing back from our patients is one way we can continue to improve our services. Please take a few minutes to complete the written survey that you may receive in the mail after your visit with us. Thank you!             Your Updated Medication List - Protect others around you: Learn how to safely use, store and throw away your medicines at www.disposemymeds.org.          This list is accurate as of: 5/8/17  4:07 PM.  Always use your most recent med list.                   Brand Name Dispense Instructions for use    methocarbamol 500 MG tablet    ROBAXIN    30 tablet    Take 2 tablets (1,000 mg) by mouth 2 times daily as needed for muscle spasms       prenatal multivitamin  plus iron 27-0.8 MG Tabs per tablet      Take 1 tablet by mouth daily

## 2017-10-24 ENCOUNTER — MYC MEDICAL ADVICE (OUTPATIENT)
Dept: FAMILY MEDICINE | Facility: CLINIC | Age: 37
End: 2017-10-24

## 2017-10-24 DIAGNOSIS — A60.9 HSV (HERPES SIMPLEX VIRUS) ANOGENITAL INFECTION: ICD-10-CM

## 2017-10-24 RX ORDER — ACYCLOVIR 200 MG/1
200 CAPSULE ORAL
Qty: 25 CAPSULE | Refills: 1 | Status: SHIPPED | OUTPATIENT
Start: 2017-10-24 | End: 2019-01-10

## 2017-10-24 RX ORDER — VALACYCLOVIR HYDROCHLORIDE 500 MG/1
TABLET, FILM COATED ORAL
Status: CANCELLED | OUTPATIENT
Start: 2017-10-24

## 2018-03-13 ENCOUNTER — OFFICE VISIT (OUTPATIENT)
Dept: FAMILY MEDICINE | Facility: CLINIC | Age: 38
End: 2018-03-13
Payer: COMMERCIAL

## 2018-03-13 ENCOUNTER — RADIANT APPOINTMENT (OUTPATIENT)
Dept: GENERAL RADIOLOGY | Facility: CLINIC | Age: 38
End: 2018-03-13
Attending: FAMILY MEDICINE
Payer: COMMERCIAL

## 2018-03-13 VITALS
WEIGHT: 159 LBS | TEMPERATURE: 97.8 F | DIASTOLIC BLOOD PRESSURE: 82 MMHG | SYSTOLIC BLOOD PRESSURE: 108 MMHG | HEART RATE: 68 BPM | RESPIRATION RATE: 18 BRPM | HEIGHT: 66 IN | BODY MASS INDEX: 25.55 KG/M2

## 2018-03-13 DIAGNOSIS — R07.89 CHEST WALL PAIN: Primary | ICD-10-CM

## 2018-03-13 DIAGNOSIS — R07.89 CHEST WALL PAIN: ICD-10-CM

## 2018-03-13 PROCEDURE — 71101 X-RAY EXAM UNILAT RIBS/CHEST: CPT | Mod: RT

## 2018-03-13 PROCEDURE — 99213 OFFICE O/P EST LOW 20 MIN: CPT | Performed by: FAMILY MEDICINE

## 2018-03-13 RX ORDER — IBUPROFEN 800 MG/1
800 TABLET, FILM COATED ORAL EVERY 8 HOURS PRN
Qty: 90 TABLET | Refills: 1 | Status: SHIPPED | OUTPATIENT
Start: 2018-03-13 | End: 2019-01-10

## 2018-03-13 NOTE — PROGRESS NOTES
SUBJECTIVE:   Judi Wayne is a 38 year old female who presents to clinic today for the following health issues:      Musculoskeletal problem/pain      Duration: Saturday- [place] pop last night and the pain has been worse since then    Description  Location: Ribs- right side .      Intensity:  moderate    Accompanying signs and symptoms: radiation of pain to ribs     History  Previous similar problem: no   Previous evaluation:  none    Precipitating or alleviating factors:  Trauma or overuse: YES- fell down steps. About 6 steps. Wooden steps with carpet. Landed on her right side   Aggravating factors include: standing, walking, exercise and overuse    Therapies tried and outcome: rest/inactivity, ice and Ibuprofen        Problem list and histories reviewed & adjusted, as indicated.  Additional history: as documented    Patient Active Problem List   Diagnosis     Female infertility of other specified origin     Tobacco use disorder     HSV (herpes simplex virus) anogenital infection     CARDIOVASCULAR SCREENING; LDL GOAL LESS THAN 160     Acne     Labial cyst     Prenatal care, subsequent pregnancy     Past Surgical History:   Procedure Laterality Date     C ORAL SURGERY PROCEDURE      wisdom teeth       Social History   Substance Use Topics     Smoking status: Current Every Day Smoker     Packs/day: 0.25     Types: Cigarettes     Smokeless tobacco: Never Used      Comment: smoking 15cig/day- down to 10cig/day with pregnancy     Alcohol use No      Comment: occas- quit with pregnancy     Family History   Problem Relation Age of Onset     HEART DISEASE Maternal Grandmother      CHF     Hypertension Maternal Grandmother      HEART DISEASE Maternal Grandfather      MI     Hypertension Maternal Grandfather      Blood Disease Mother      anemia     Hypertension Mother      CANCER Father      eye     CEREBROVASCULAR DISEASE Paternal Grandmother      Hypertension Paternal Grandmother      Breast Cancer Maternal Aunt   "    Hypertension Brother      Eye Disorder Daughter      cataract surgery         Current Outpatient Prescriptions   Medication Sig Dispense Refill     acyclovir (ZOVIRAX) 200 MG capsule Take 1 capsule (200 mg) by mouth 5 times daily (Patient not taking: Reported on 3/13/2018) 25 capsule 1     No Known Allergies  Recent Labs   Lab Test  03/16/15   1748  03/16/15   1746  12/20/10   1507   LDL  109   --    --    CR   --   0.70   --    GFRESTIMATED   --   >90  Non  GFR Calc     --    GFRESTBLACK   --   >90   GFR Calc     --    POTASSIUM   --   4.1   --    TSH   --   2.79  1.37      BP Readings from Last 3 Encounters:   03/13/18 108/82   05/08/17 120/62   03/06/17 102/84    Wt Readings from Last 3 Encounters:   03/13/18 159 lb (72.1 kg)   05/08/17 159 lb 9.6 oz (72.4 kg)   03/06/17 159 lb 9.6 oz (72.4 kg)                  Labs reviewed in EPIC    Reviewed and updated as needed this visit by clinical staff       Reviewed and updated as needed this visit by Provider         ROS:  Constitutional, HEENT, cardiovascular, pulmonary, gi and gu systems are negative, except as otherwise noted.    OBJECTIVE:     /82 (Cuff Size: Adult Regular)  Pulse 68  Temp 97.8  F (36.6  C) (Tympanic)  Resp 18  Ht 5' 6\" (1.676 m)  Wt 159 lb (72.1 kg)  Breastfeeding? No  BMI 25.66 kg/m2  Body mass index is 25.66 kg/(m^2).  GENERAL: healthy, alert and no distress  NECK: no adenopathy, no asymmetry, masses, or scars and thyroid normal to palpation  RESP: lungs clear to auscultation - no rales, rhonchi or wheezes, mildly tender right posterior ribs, no swelling or skin discoloration noted  CV: regular rates and rhythm, normal S1 S2, no S3 or S4 and no murmur, click or rub  ABDOMEN: soft, nontender, no hepatosplenomegaly, no masses and bowel sounds normal  SKIN: no suspicious lesions or rashes  CNS: CNII-XII intact, normal strength and sensory exam, PERRLA, SLR negative, reflexes 2+, pedal pulses 3+    XR " RIBS & CHEST RT 3VW 3/13/2018 3:11 PM     COMPARISON: None.     HISTORY: Right posterior chest wall pain.         IMPRESSION: Cardiac silhouette and pulmonary vasculature are within  normal limits. No focal airspace disease, pleural effusion or  pneumothorax. No rib fractures are seen.      ASSESSMENT/PLAN:         ICD-10-CM    1. Chest wall pain R07.89 XR Ribs & Chest Right G/E 3 Views     ibuprofen (ADVIL/MOTRIN) 800 MG tablet       Suspect symptoms likely musculoskeletal in etiology.  Chest x-ray came back negative.  Reassurance provided.  Ibuprofen prescribed, common side effect discussed.  Suggested to continue heating pads/massage.  Return criteria discussed in detail.  Patient understood and in agreement with above plan.  All questions answered.        Kelvin Shetty MD  [facility]

## 2018-03-13 NOTE — NURSING NOTE
"Chief Complaint   Patient presents with     Musculoskeletal Problem       Initial /82 (Cuff Size: Adult Regular)  Pulse 68  Temp 97.8  F (36.6  C) (Tympanic)  Resp 18  Ht 5' 6\" (1.676 m)  Wt 159 lb (72.1 kg)  Breastfeeding? No  BMI 25.66 kg/m2 Estimated body mass index is 25.66 kg/(m^2) as calculated from the following:    Height as of this encounter: 5' 6\" (1.676 m).    Weight as of this encounter: 159 lb (72.1 kg).      Health Maintenance that is potentially due pending provider review:  NONE    n/a    Is there anyone who you would like to be able to receive your results? Not Applicable  If yes have patient fill out AVERY    "

## 2018-03-13 NOTE — MR AVS SNAPSHOT
"              After Visit Summary   3/13/2018    Judi Wayne    MRN: [id number]           Patient Information     Date Of Birth          1980        Visit Information        Provider Department      3/13/2018 2:40 PM Kelvin Shetty MD [facility]        Today's Diagnoses     Chest wall pain    -  1       Follow-ups after your visit        Who to contact     If you have questions or need follow up information about today's clinic visit or your schedule please contact [facility] directly at [phone].  Normal or non-critical lab and imaging results will be communicated to you by [employer], letter or phone within 4 business days after the clinic has received the results. If you do not hear from us within 7 days, please contact the clinic through En[employer] or phone. If you have a critical or abnormal lab result, we will notify you by phone as soon as possible.  Submit refill requests through [employer] or call your pharmacy and they will forward the refill request to us. Please allow 3 business days for your refill to be completed.          Additional Information About Your Visit        MyChart Information     [employer] gives you secure access to your electronic health record. If you see a primary care provider, you can also send messages to your care team and make appointments. If you have questions, please call your primary care clinic.  If you do not have a primary care provider, please call [phone] and they will assist you.        Care EveryWhere ID     This is your Care EveryWhere ID. This could be used by other organizations to access your [place] medical records  [id number]        Your Vitals Were     Pulse Temperature Respirations Height Breastfeeding? BMI (Body Mass Index)    68 97.8  F (36.6  C) (Tympanic) 18 5' 6\" (1.676 m) No 25.66 kg/m2       Blood Pressure from Last 3 Encounters:   03/13/18 108/82   05/08/17 120/62   03/06/17 102/84    Weight from Last 3 " Encounters:   03/13/18 159 lb (72.1 kg)   05/08/17 159 lb 9.6 oz (72.4 kg)   03/06/17 159 lb 9.6 oz (72.4 kg)               Primary Care Provider Office Phone # Fax #    Vanessa Mackay [phone] [address] [place] [id number]        Equal Access to Services     CAITLIN BARRIOS : Hadii aad ku hadasho Soomaali, waaxda luqadaha, qaybta kaalmada adeegyada, waxay idiin hayaan adeeg kharash la'aan ah. So [facility] [phone].    ATENCIÓN: Si habla espshonda, tiene a trevino disposición servicios gratuitos de asistencia lingüística. Llame al [phone].    We comply with applicable federal civil rights laws and [place] laws. We do not discriminate on the basis of race, color, national origin, age, disability, sex, sexual orientation, or gender identity.            Thank you!     Thank you for choosing [facility]  for your care. Our goal is always to provide you with excellent care. Hearing back from our patients is one way we can continue to improve our services. Please take a few minutes to complete the written survey that you may receive in the mail after your visit with us. Thank you!             Your Updated Medication List - Protect others around you: Learn how to safely use, store and throw away your medicines at www.disposemymeds.org.          This list is accurate as of 3/13/18  3:28 PM.  Always use your most recent med list.                   Brand Name Dispense Instructions for use Diagnosis    acyclovir 200 MG capsule    ZOVIRAX    25 capsule    Take 1 capsule (200 mg) by mouth 5 times daily    HSV (herpes simplex virus) anogenital infection

## 2018-05-20 ENCOUNTER — HEALTH MAINTENANCE LETTER (OUTPATIENT)
Age: 38
End: 2018-05-20

## 2018-08-31 ENCOUNTER — TELEPHONE (OUTPATIENT)
Dept: FAMILY MEDICINE | Facility: CLINIC | Age: 38
End: 2018-08-31

## 2018-08-31 NOTE — TELEPHONE ENCOUNTER
8/31/2018    Call Regarding Preventive Health Screening Cervical/PAP       Attempt 1    Message on voicemail     Comments:       Outreach   SV

## 2019-01-10 ENCOUNTER — OFFICE VISIT (OUTPATIENT)
Dept: FAMILY MEDICINE | Facility: CLINIC | Age: 39
End: 2019-01-10
Payer: COMMERCIAL

## 2019-01-10 VITALS
DIASTOLIC BLOOD PRESSURE: 64 MMHG | RESPIRATION RATE: 20 BRPM | WEIGHT: 146 LBS | HEART RATE: 84 BPM | BODY MASS INDEX: 23.46 KG/M2 | TEMPERATURE: 100.3 F | HEIGHT: 66 IN | SYSTOLIC BLOOD PRESSURE: 98 MMHG

## 2019-01-10 DIAGNOSIS — J02.9 SORE THROAT: Primary | ICD-10-CM

## 2019-01-10 DIAGNOSIS — F17.200 TOBACCO USE DISORDER: ICD-10-CM

## 2019-01-10 LAB
DEPRECATED S PYO AG THROAT QL EIA: NORMAL
SPECIMEN SOURCE: NORMAL

## 2019-01-10 PROCEDURE — 87880 STREP A ASSAY W/OPTIC: CPT | Performed by: NURSE PRACTITIONER

## 2019-01-10 PROCEDURE — 87081 CULTURE SCREEN ONLY: CPT | Performed by: NURSE PRACTITIONER

## 2019-01-10 PROCEDURE — 99213 OFFICE O/P EST LOW 20 MIN: CPT | Performed by: NURSE PRACTITIONER

## 2019-01-10 RX ORDER — FLUTICASONE PROPIONATE 50 MCG
2 SPRAY, SUSPENSION (ML) NASAL DAILY
Qty: 1 BOTTLE | Refills: 0 | Status: SHIPPED | OUTPATIENT
Start: 2019-01-10 | End: 2019-01-10

## 2019-01-10 ASSESSMENT — MIFFLIN-ST. JEOR: SCORE: 1359

## 2019-01-10 NOTE — NURSING NOTE
"Chief Complaint   Patient presents with     Pharyngitis       Initial BP 98/64 (BP Location: Right arm, Patient Position: Sitting, Cuff Size: Adult Large)   Pulse 84   Temp 100.3  F (37.9  C) (Tympanic)   Resp 20   Ht 1.676 m (5' 6\")   Wt 66.2 kg (146 lb)   LMP 01/05/2019   BMI 23.57 kg/m   Estimated body mass index is 23.57 kg/m  as calculated from the following:    Height as of this encounter: 1.676 m (5' 6\").    Weight as of this encounter: 66.2 kg (146 lb).    Patient presents to the clinic using No DME    Health Maintenance that is potentially due pending provider review:  Pap Smear    Pt has a scheduled Pap appt.    Is there anyone who you would like to be able to receive your results? No  If yes have patient fill out AVERY    "

## 2019-01-10 NOTE — PROGRESS NOTES
"  SUBJECTIVE:   Judi Wayne is a 38 year old female who presents to clinic today for the following health issues:       SYMPTOMS      Duration: 3 days     Description  sore throat, facial pain/pressure, cough, fever, chills, headache, fatigue/malaise, hoarse voice, myalgias and diarrhea    Severity: Worsening     Accompanying signs and symptoms: None    History (predisposing factors):  Children have been ill     Precipitating or alleviating factors: None    Therapies tried and outcome:  Acetaminophen, cough drops, and IBU as needed      PCP   Vanessa Mackay [phone]    Health Maintenance        Health Maintenance Due   Topic Date Due     DTAP/TDAP/TD IMMUNIZATION (2 - Td) 08/29/2016     PAP Q3 YR  03/16/2018     INFLUENZA VACCINE (1) 09/01/2018     Patient notes she has a pap appointment set-up with her PCP  HPI        Patient Active Problem List   Diagnosis     Female infertility of other specified origin     Tobacco use disorder     HSV (herpes simplex virus) anogenital infection     CARDIOVASCULAR SCREENING; LDL GOAL LESS THAN 160     Acne     Labial cyst     Prenatal care, subsequent pregnancy     No current outpatient medications on file.     No current facility-administered medications for this visit.        Reviewed and updated:  Tobacco  Allergies  Meds  Med Hx  Surg Hx  Fam Hx  Soc Hx     ROS:  Constitutional, HEENT, cardiovascular, pulmonary, gi and gu systems are negative, except as otherwise noted.    PHYSICAL EXAM   BP 98/64 (BP Location: Right arm, Patient Position: Sitting, Cuff Size: Adult Large)   Pulse 84   Temp 100.3  F (37.9  C) (Tympanic)   Resp 20   Ht 1.676 m (5' 6\")   Wt 66.2 kg (146 lb)   LMP 01/05/2019   BMI 23.57 kg/m    Body mass index is 23.57 kg/m .  GENERAL APPEARANCE: healthy, alert and no distress  EYES: Eyes grossly normal to inspection, PERRL and conjunctivae and sclerae normal  HENT: ear canals and TM's normal and nose and mouth without ulcers or " lesions, clear fluid behind right TM, clear rhinorrhea, posterior oropharyngeal cobblestoning, 2 tonsilliths to right side  NECK: no adenopathy, no asymmetry, masses, or scars and thyroid normal to palpation  RESP: lungs clear to auscultation - no rales, rhonchi or wheezes  CV: regular rates and rhythm, normal S1 S2, no S3 or S4 and no murmur, click or rub  MS: extremities normal- no gross deformities noted  PSYCH: mentation appears normal and affect normal/bright    Results for orders placed or performed in visit on 01/10/19   Rapid strep screen   Result Value Ref Range    Specimen Description Throat     Rapid Strep A Screen       NEGATIVE: No Group A streptococcal antigen detected by immunoassay, await culture report.       ASSESSMENT & PLAN   1. Sore throat  Acute  - Rapid strep screen  Gargle with salt water twice daily  Results for orders placed or performed in visit on 01/10/19   Rapid strep screen   Result Value Ref Range    Specimen Description Throat     Rapid Strep A Screen       NEGATIVE: No Group A streptococcal antigen detected by immunoassay, await culture report.     2. Cold  Acute  Follow instructions below    3. Tobacco use disorder  Chronic, stable  Recommendation for smoking cessation. Please let us know if we can be of any help  QUITPLAN: 2-998-111-PLAN ([phone])  Everyone in [place] has access to free telephone helpline support to quit tobacco either through their health plan or through QUITPLAN Services. The QUITPLAN web program is free to everyone regardless of coverage.   The QUITPLAN Helpline, including gum, patches, lozenges, is free to the uninsured and those without coverage.         Adult Self-Care for Colds  Colds are caused by viruses. They can t be cured with antibiotics. However, you can relieve symptoms and support your body s efforts to heal itself. No matter which symptoms you have, be sure to drink plenty of fluids (water or clear soup); stop smoking and drinking alcohol; and get  plenty of rest.   Understand a Fever    Take your temperature several times a day. If your fever is 100.4 F for more than a day, call your doctor.    Relax, lie down. Go to bed if you want. Just get off your feet and rest. Also, drink plenty of fluids to avoid dehydration.    Take acetaminophen or a nonsteroidal anti-inflammatory agent (NSAID), such as ibuprofen.  Treat a Troubled Nose Kindly    Breathe steam or heated humidified air to open blocked nasal passages.  a hot shower or use a vaporizer. Be careful not to get burned by the steam.    Saline nasal sprays and decongestant tablets help open a stuffy nose. Antihistamines can also help, but they can cause side effects such as drowsiness and drying of the eyes, nose, and mouth.  Soothe a Sore Throat and Cough    Gargle every 2 hours with 1/4 teaspoon of salt dissolved in 1/2 cup of warm water. Suck on throat lozenges and cough drops to moisten your throat.    Cough medicines are available but it is unclear how effective they actually are.    Take acetaminophen or an NSAID, such as ibuprofen.  Ease Digestive Problems    Put fluid back into your body. Take frequent sips of clear liquids such as water or broth. Do not drink beverages with a lot of sugar in them, such as juices and sodas. These can make diarrhea worse. Older children and adults can drink sports drinks.    As your appetite returns, you can resume your normal diet. Ask your doctor whether there are any foods you should avoid.     When to Call Your Doctor  When you first notice symptoms, ask your health care provider about antiviral medication. If taken soon after flu symptoms start, this can help you get well sooner. (Antibiotics should not be taken for colds or flu.) Also, call your doctor if you have any of the following symptoms or if you aren t feeling better after 7 days:    Shortness of breath    Pain or pressure in the chest or abdomen    Worsening symptoms, especially after a period of  improvement    Fever of 100.4 F  (38.0 C) or higher, or fever that doesn t go down with medication    Sudden dizziness or confusion    Severe or continued vomiting    Signs of dehydration, including extreme thirst, dark urine, infrequent urination, dry mouth    Spotted, red, or very sore throat     [id number] [employer], [address] [place] [address], [place] [address]. All rights reserved. This information is not intended as a substitute for professional medical care. Always follow your healthcare professional's instructions.      Nasal congestion  The sinuses are air-filled spaces within the bones of the face. They connect to the inside of the nose. Sinusitis is an inflammation of the tissue lining the sinus cavity. Sinus inflammation can occur during a cold or hay-fever (allergies to pollens and other particles in the air) and cause symptoms of sinus congestion and fullness and perhaps a low-grade fever. These symptoms can last up to 7-10 days. This does not require antibiotic treatment.  Home Care:  1. Drink plenty of water, hot tea, and other liquids to stay well hydrated. This thins the mucus and promotes sinus drainage.  2. Apply heat to the painful areas of the face. Use a towel soaked in hot water. Or,  the shower and direct the hot spray onto your face. This is a good way to inhale warm water vapor and get heat on your face at the same time. (Cover your mouth and nose with your hands so you can still breathe as you do this.)  3. Use a vaporizer with products such as Vicks VapoRub (contains menthol) at night. Suck on peppermint, menthol or eucalyptus hard candies during the day.  4. An expectorant containing guaifenesin (such as Robitussin), helps to thin the mucus and promote drainage from the sinuses.  5. Over-the-counter decongestants may be used unless a similar medicine was prescribed. Nasal sprays work the fastest. Use one that contains phenylephrine (Gustavo-synephrine, Sinex, Flonase or others).  First blow the nose gently to remove mucus, then apply the drops. Do not use these medicines more often than directed on the label or for more than three days or symptoms may worsen. You may also use tablets containing pseudoephedrine (Sudafed). Many sinus remedies combine ingredients, which may increase side effects. Read the labels or ask the pharmacist for help. NOTE: Persons with high blood pressure should not use decongestants. They can raise blood pressure.  Look at CORICIDIN products instead.  6. Antihistamines are useful if allergies are a cause of your sinusitis. The mildest one is chlorpheniramine (available without a prescription). The dose for adults is 8-12mg three times a day. [NOTE: Do not use chlorpheniramine if you have glaucoma or if you are a man with trouble urinating due to an enlarged prostate.] Claritin (loratidine) is an antihistamine that causes less drowsiness and is a good alternative for daytime use.  7. When allergies are the cause for sinusitis, a saline nasal rinse alone may give relief. Saline nasal rinse reduces swelling and clears excess mucus. This allows sinuses to drain. Pre-packaged cans are available at most drug stores. These contain pre-mixed salt in an irrigation device.  8. You may use acetaminophen (Tylenol) or ibuprofen (Motrin, Advil) to control pain, unless another pain medicine was prescribed. [ NOTE: If you have chronic liver or kidney disease or ever had a stomach ulcer, talk with your doctor before using these medicines.] (Aspirin should never be used in anyone under 18 years of age who is ill with a fever. It may cause severe liver damage.)  9. Using a Neti pot, or pre-mixed nasal saline rinse daily. This will help clear your sinuses of mucous:  Nasal saline rinses  Rinses help keep your sinuses and nose moist and flush out mucous. Use a pre-mixed rinse. Take a steam shower first. Then tilt your head to one side and spray. Allow the rinse to flow for a little bit  to get into all the crevices. Then blow your nose. Repeat on the other nostril.  Humidification will also help- steam your head for 10 minutes, take a steam shower for 10 minutes, and ensure your dwelling has sufficient humidification.      Medications  Your doctor may prescribe medications to help treat your sinusitis. If you have an infection, antibiotics can help clear it up. If you are prescribed antibiotics, take all pills on schedule until they are gone, even if you feel better. Decongestants help relieve swelling. Use decongestant sprays for short periods only under the direction of your doctor. If you have allergies, your doctor may prescribe medications to help relieve them.   Decongestants- over-the-counter Pseudophedrine  Nasal corticosteroid spray- Nasonex, Flonase  Antihistamine- over-the-counter Claritin, Allegra, Zyrtec etc...   Apply Hot or Cold Packs  Applying heat to the area surrounding your sinuses may make you feel more comfortable. Use a hot water bottle or a hand towel dipped in hot water. Some people also find ice packs effective for relieving pain.  Get Prompt Medical Attention  if any of the following occur:    Worsening sinus pain or headache    Stiff neck    Unusual drowsiness, confusion or not acting like your normal self    Swelling of the forehead or eyelids    Vision problems including blurred or double vision    Fever of 100.4 F (38 C) or higher, or as directed by your healthcare provider    Seizure    [phone] [facility], [address] [place] [address], [place] [address]. All rights reserved. This information is not intended as a substitute for professional medical care. Always follow your healthcare professional's instructions.              Risks, benefits, side effects and rationale for treatment plan fully discussed with the patient and understanding expressed.    Jada Mora, [email or web address]-BC  [facility]

## 2019-01-10 NOTE — PATIENT INSTRUCTIONS
1. Sore throat  Acute  - Rapid strep screen  Gargle with salt water twice daily  Results for orders placed or performed in visit on 01/10/19   Rapid strep screen   Result Value Ref Range    Specimen Description Throat     Rapid Strep A Screen       NEGATIVE: No Group A streptococcal antigen detected by immunoassay, await culture report.     2. Cold  Acute  Follow instructions below    3. Tobacco use disorder  Chronic, stable  Recommendation for smoking cessation. Please let us know if we can be of any help  QUITPLAN: [phone]-PLAN ([phone])  Everyone in [place] has access to free telephone helpline support to quit tobacco either through their health plan or through QUITPLAN Services. The QUITPLAN web program is free to everyone regardless of coverage.   The QUITPLAN Helpline, including gum, patches, lozenges, is free to the uninsured and those without coverage.         Adult Self-Care for Colds  Colds are caused by viruses. They can t be cured with antibiotics. However, you can relieve symptoms and support your body s efforts to heal itself. No matter which symptoms you have, be sure to drink plenty of fluids (water or clear soup); stop smoking and drinking alcohol; and get plenty of rest.   Understand a Fever    Take your temperature several times a day. If your fever is 100.4 F for more than a day, call your doctor.    Relax, lie down. Go to bed if you want. Just get off your feet and rest. Also, drink plenty of fluids to avoid dehydration.    Take acetaminophen or a nonsteroidal anti-inflammatory agent (NSAID), such as ibuprofen.  Treat a Troubled Nose Kindly    Breathe steam or heated humidified air to open blocked nasal passages.  a hot shower or use a vaporizer. Be careful not to get burned by the steam.    Saline nasal sprays and decongestant tablets help open a stuffy nose. Antihistamines can also help, but they can cause side effects such as drowsiness and drying of the eyes, nose, and  mouth.  Soothe a Sore Throat and Cough    Gargle every 2 hours with 1/4 teaspoon of salt dissolved in 1/2 cup of warm water. Suck on throat lozenges and cough drops to moisten your throat.    Cough medicines are available but it is unclear how effective they actually are.    Take acetaminophen or an NSAID, such as ibuprofen.  Ease Digestive Problems    Put fluid back into your body. Take frequent sips of clear liquids such as water or broth. Do not drink beverages with a lot of sugar in them, such as juices and sodas. These can make diarrhea worse. Older children and adults can drink sports drinks.    As your appetite returns, you can resume your normal diet. Ask your doctor whether there are any foods you should avoid.     When to Call Your Doctor  When you first notice symptoms, ask your health care provider about antiviral medication. If taken soon after flu symptoms start, this can help you get well sooner. (Antibiotics should not be taken for colds or flu.) Also, call your doctor if you have any of the following symptoms or if you aren t feeling better after 7 days:    Shortness of breath    Pain or pressure in the chest or abdomen    Worsening symptoms, especially after a period of improvement    Fever of 100.4 F  (38.0 C) or higher, or fever that doesn t go down with medication    Sudden dizziness or confusion    Severe or continued vomiting    Signs of dehydration, including extreme thirst, dark urine, infrequent urination, dry mouth    Spotted, red, or very sore throat     [address] [place] [address]. All rights reserved. This information is not intended as a substitute for professional medical care. Always follow your healthcare professional's instructions.      Nasal congestion  The sinuses are air-filled spaces within the bones of the face. They connect to the inside of the nose. Sinusitis is an inflammation of the tissue lining the sinus cavity. Sinus  inflammation can occur during a cold or hay-fever (allergies to pollens and other particles in the air) and cause symptoms of sinus congestion and fullness and perhaps a low-grade fever. These symptoms can last up to 7-10 days. This does not require antibiotic treatment.  Home Care:  1. Drink plenty of water, hot tea, and other liquids to stay well hydrated. This thins the mucus and promotes sinus drainage.  2. Apply heat to the painful areas of the face. Use a towel soaked in hot water. Or,  the shower and direct the hot spray onto your face. This is a good way to inhale warm water vapor and get heat on your face at the same time. (Cover your mouth and nose with your hands so you can still breathe as you do this.)  3. Use a vaporizer with products such as Vicks VapoRub (contains menthol) at night. Suck on peppermint, menthol or eucalyptus hard candies during the day.  4. An expectorant containing guaifenesin (such as Robitussin), helps to thin the mucus and promote drainage from the sinuses.  5. Over-the-counter decongestants may be used unless a similar medicine was prescribed. Nasal sprays work the fastest. Use one that contains phenylephrine (Gustavo-synephrine, Sinex, Flonase or others). First blow the nose gently to remove mucus, then apply the drops. Do not use these medicines more often than directed on the label or for more than three days or symptoms may worsen. You may also use tablets containing pseudoephedrine (Sudafed). Many sinus remedies combine ingredients, which may increase side effects. Read the labels or ask the pharmacist for help. NOTE: Persons with high blood pressure should not use decongestants. They can raise blood pressure.  Look at CORICIDIN products instead.  6. Antihistamines are useful if allergies are a cause of your sinusitis. The mildest one is chlorpheniramine (available without a prescription). The dose for adults is 8-12mg three times a day. [NOTE: Do not use  chlorpheniramine if you have glaucoma or if you are a man with trouble urinating due to an enlarged prostate.] Claritin (loratidine) is an antihistamine that causes less drowsiness and is a good alternative for daytime use.  7. When allergies are the cause for sinusitis, a saline nasal rinse alone may give relief. Saline nasal rinse reduces swelling and clears excess mucus. This allows sinuses to drain. Pre-packaged cans are available at most drug stores. These contain pre-mixed salt in an irrigation device.  8. You may use acetaminophen (Tylenol) or ibuprofen (Motrin, Advil) to control pain, unless another pain medicine was prescribed. [ NOTE: If you have chronic liver or kidney disease or ever had a stomach ulcer, talk with your doctor before using these medicines.] (Aspirin should never be used in anyone under 18 years of age who is ill with a fever. It may cause severe liver damage.)  9. Using a Neti pot, or pre-mixed nasal saline rinse daily. This will help clear your sinuses of mucous:  Nasal saline rinses  Rinses help keep your sinuses and nose moist and flush out mucous. Use a pre-mixed rinse. Take a steam shower first. Then tilt your head to one side and spray. Allow the rinse to flow for a little bit to get into all the crevices. Then blow your nose. Repeat on the other nostril.  Humidification will also help- steam your head for 10 minutes, take a steam shower for 10 minutes, and ensure your dwelling has sufficient humidification.      Medications  Your doctor may prescribe medications to help treat your sinusitis. If you have an infection, antibiotics can help clear it up. If you are prescribed antibiotics, take all pills on schedule until they are gone, even if you feel better. Decongestants help relieve swelling. Use decongestant sprays for short periods only under the direction of your doctor. If you have allergies, your doctor may prescribe medications to help relieve them.   Decongestants-  over-the-counter Pseudophedrine  Nasal corticosteroid spray- Nasonex, Flonase  Antihistamine- over-the-counter Claritin, Allegra, Zyrtec etc...   Apply Hot or Cold Packs  Applying heat to the area surrounding your sinuses may make you feel more comfortable. Use a hot water bottle or a hand towel dipped in hot water. Some people also find ice packs effective for relieving pain.  Get Prompt Medical Attention  if any of the following occur:    Worsening sinus pain or headache    Stiff neck    Unusual drowsiness, confusion or not acting like your normal self    Swelling of the forehead or eyelids    Vision problems including blurred or double vision    Fever of 100.4 F (38 C) or higher, or as directed by your healthcare provider    Seizure    9578-9995 [employer], [address] [place] [address], [place] [address]. All rights reserved. This information is not intended as a substitute for professional medical care. Always follow your healthcare professional's instructions.

## 2019-01-11 LAB
BACTERIA SPEC CULT: NORMAL
SPECIMEN SOURCE: NORMAL

## 2019-01-11 NOTE — RESULT ENCOUNTER NOTE
Dear Judi,  Final Beta strep group A r/o culture is NEGATIVE for Group A streptococcus.    No treatment or change in treatment per [facility] Strep protocol.    Please contact our clinic via phone or My Chart if you have any questions or concerns.  Thanks,  JACKY Ness

## 2019-01-18 ENCOUNTER — OFFICE VISIT (OUTPATIENT)
Dept: FAMILY MEDICINE | Facility: CLINIC | Age: 39
End: 2019-01-18
Payer: COMMERCIAL

## 2019-01-18 ENCOUNTER — ANCILLARY PROCEDURE (OUTPATIENT)
Dept: GENERAL RADIOLOGY | Facility: CLINIC | Age: 39
End: 2019-01-18
Payer: COMMERCIAL

## 2019-01-18 VITALS
OXYGEN SATURATION: 99 % | HEART RATE: 98 BPM | DIASTOLIC BLOOD PRESSURE: 72 MMHG | SYSTOLIC BLOOD PRESSURE: 96 MMHG | BODY MASS INDEX: 23.33 KG/M2 | HEIGHT: 66 IN | TEMPERATURE: 97.4 F | WEIGHT: 145.2 LBS

## 2019-01-18 DIAGNOSIS — K64.4 EXTERNAL HEMORRHOIDS: ICD-10-CM

## 2019-01-18 DIAGNOSIS — Z00.01 ENCOUNTER FOR WELL ADULT EXAM WITH ABNORMAL FINDINGS: Primary | ICD-10-CM

## 2019-01-18 DIAGNOSIS — G89.29 CHRONIC PAIN OF RIGHT KNEE: ICD-10-CM

## 2019-01-18 DIAGNOSIS — M77.11 RIGHT LATERAL EPICONDYLITIS: ICD-10-CM

## 2019-01-18 DIAGNOSIS — M25.561 CHRONIC PAIN OF RIGHT KNEE: ICD-10-CM

## 2019-01-18 DIAGNOSIS — N89.8 VAGINAL DISCHARGE: ICD-10-CM

## 2019-01-18 DIAGNOSIS — Z01.419 ENCOUNTER FOR GYNECOLOGICAL EXAMINATION WITH PAPANICOLAOU SMEAR OF CERVIX: ICD-10-CM

## 2019-01-18 LAB
SPECIMEN SOURCE: NORMAL
WET PREP SPEC: NORMAL

## 2019-01-18 PROCEDURE — 73560 X-RAY EXAM OF KNEE 1 OR 2: CPT | Mod: RT

## 2019-01-18 PROCEDURE — G0145 SCR C/V CYTO,THINLAYER,RESCR: HCPCS | Performed by: FAMILY MEDICINE

## 2019-01-18 PROCEDURE — 99214 OFFICE O/P EST MOD 30 MIN: CPT | Mod: 25 | Performed by: FAMILY MEDICINE

## 2019-01-18 PROCEDURE — 87624 HPV HI-RISK TYP POOLED RSLT: CPT | Performed by: FAMILY MEDICINE

## 2019-01-18 PROCEDURE — 99395 PREV VISIT EST AGE 18-39: CPT | Performed by: FAMILY MEDICINE

## 2019-01-18 PROCEDURE — 87210 SMEAR WET MOUNT SALINE/INK: CPT | Performed by: FAMILY MEDICINE

## 2019-01-18 ASSESSMENT — ENCOUNTER SYMPTOMS
DIZZINESS: 0
WEAKNESS: 0
PARESTHESIAS: 0
DIARRHEA: 0
MYALGIAS: 1
SHORTNESS OF BREATH: 0
DYSURIA: 0
HEARTBURN: 0
JOINT SWELLING: 0
ARTHRALGIAS: 0
ABDOMINAL PAIN: 0
PALPITATIONS: 0
HEMATURIA: 0
CONSTIPATION: 0
NERVOUS/ANXIOUS: 0
HEMATOCHEZIA: 0
CHILLS: 0
EYE PAIN: 0
COUGH: 1
FREQUENCY: 0
SORE THROAT: 0
BREAST MASS: 0
FEVER: 0
HEADACHES: 1
NAUSEA: 0

## 2019-01-18 ASSESSMENT — MIFFLIN-ST. JEOR: SCORE: 1355.37

## 2019-01-18 NOTE — NURSING NOTE
"Chief Complaint   Patient presents with     Physical       Initial BP 96/72 (BP Location: Right arm, Patient Position: Chair, Cuff Size: Adult Regular)   Pulse 98   Temp 97.4  F (36.3  C) (Tympanic)   Ht 1.676 m (5' 6\")   Wt 65.9 kg (145 lb 3.2 oz)   LMP 01/05/2019 (LMP Unknown)   SpO2 99%   Breastfeeding? No   BMI 23.44 kg/m   Estimated body mass index is 23.44 kg/m  as calculated from the following:    Height as of this encounter: 1.676 m (5' 6\").    Weight as of this encounter: 65.9 kg (145 lb 3.2 oz).    Patient presents to the clinic using No DME    Health Maintenance that is potentially due pending provider review:  Pap Smear    Possibly completing today per provider review.    Is there anyone who you would like to be able to receive your results? No  If yes have patient fill out AVERY    "

## 2019-01-18 NOTE — PATIENT INSTRUCTIONS
Preventive Health Recommendations  Female Ages 26 - 39  Yearly exam:   See your health care provider every year in order to    Review health changes.     Discuss preventive care.      Review your medicines if you your doctor has prescribed any.    Until age 30: Get a Pap test every three years (more often if you have had an abnormal result).    After age 30: Talk to your doctor about whether you should have a Pap test every 3 years or have a Pap test with HPV screening every 5 years.   You do not need a Pap test if your uterus was removed (hysterectomy) and you have not had cancer.  You should be tested each year for STDs (sexually transmitted diseases), if you're at risk.   Talk to your provider about how often to have your cholesterol checked.  If you are at risk for diabetes, you should have a diabetes test (fasting glucose).  Shots: Get a flu shot each year. Get a tetanus shot every 10 years.   Nutrition:     Eat at least 5 servings of fruits and vegetables each day.    Eat whole-grain bread, whole-wheat pasta and brown rice instead of white grains and rice.    Get adequate Calcium and Vitamin D.     Lifestyle    Exercise at least 150 minutes a week (30 minutes a day, 5 days of the week). This will help you control your weight and prevent disease.    Limit alcohol to one drink per day.    No smoking.     Wear sunscreen to prevent skin cancer.    See your dentist every six months for an exam and cleaning.      Elbow pain   Try the gel 2-3 times a day   Use brace daily   Ice 2-3 times a day     Right knee pain   Xray right knee normal   Could try PT or the gel on your knee as well     Vaginal odor   Culture is negative   Try taking a probiotic     Hemorrhoids   Use tucks pads daily   Soaking in the tub will help

## 2019-01-18 NOTE — PROGRESS NOTES
SUBJECTIVE:   CC: Judi Wayne is an 38 year old woman who presents for preventive health visit.     Physical   Annual:     Getting at least 3 servings of Calcium per day:  Yes    Bi-annual eye exam:  Yes    Dental care twice a year:  Yes    Sleep apnea or symptoms of sleep apnea:  None    Diet:  Regular (no restrictions)    Frequency of exercise:  None    Taking medications regularly:  Not Applicable    Medication side effects:  Not applicable    Additional concerns today:  Yes    PHQ-2 Total Score: 0          Musculoskeletal problem/pain      Duration: months     Description  Location: right elbow    Intensity:  moderate    Accompanying signs and symptoms: radiation of pain to ahnd or upper arm     History  Previous similar problem: no   Previous evaluation:  none    Precipitating or alleviating factors:  Trauma or overuse: YES- lifting child  Aggravating factors include: overuse    Therapies tried and outcome: nothing    Musculoskeletal problem/pain      Duration: year or so off and on     Description  Location: right knee     Intensity:  mild    Accompanying signs and symptoms: none    History  Previous similar problem: no   Previous evaluation:  none    Precipitating or alleviating factors:  Trauma or overuse: no   Aggravating factors include: exercise, overuse and kneeling     Therapies tried and outcome: nothing    Hemorrhoids       Duration: since birth of last child 2 years    Description:   Pain: mild at times   Itching: YES- off and on     Accompanying signs and symptoms:   Blood in stool: no   Changes in stool pattern: no     History (similar episodes/previous evaluation): None    Precipitating or alleviating factors: child birth     Therapies tried and outcome: none    Vaginal Symptoms      Duration: x2 years     Description  odor    Intensity:  mild    Accompanying signs and symptoms (fever/dysuria/abdominal or back pain): None    History  Sexually active: yes, single partner, contraception not  required  Possibility of pregnancy: No  Recent antibiotic use: no     Precipitating or alleviating factors: None    Therapies tried and outcome: none       Today's PHQ-2 Score:   PHQ-2 ( 1999 Pf[employer]) 1/18/2019   Q1: Little interest or pleasure in doing things 0   Q2: Feeling down, depressed or hopeless 0   PHQ-2 Score 0   Q1: Little interest or pleasure in doing things Not at all   Q2: Feeling down, depressed or hopeless Not at all   PHQ-2 Score 0       Abuse: Current or Past(Physical, Sexual or Emotional)- No  Do you feel safe in your environment? Yes    Social History     Tobacco Use     Smoking status: Current Every Day Smoker     Packs/day: 0.25     Types: Cigarettes     Smokeless tobacco: Never Used     Tobacco comment: smoking 15cig/day- down to 10cig/day with pregnancy   Substance Use Topics     Alcohol use: No     Alcohol/week: 0.0 oz     Comment: occas- quit with pregnancy     Alcohol Use 1/18/2019   If you drink alcohol do you typically have greater than 3 drinks per day OR greater than 7 drinks per week? No   No flowsheet data found.    Reviewed orders with patient.  Reviewed health maintenance and updated orders accordingly - Yes  Labs reviewed in EPIC    Mammogram not appropriate for this patient based on age.    Pertinent mammograms are reviewed under the imaging tab.  History of abnormal Pap smear:   NO - age 30- 65 PAP every 3 years recommended  Last 3 Pap and HPV Results:   PAP / HPV Latest Ref Rng & Units 3/16/2015 8/6/2012 12/20/2010   PAP - NIL NIL NIL   HPV 16 DNA NEG Negative - -   HPV 18 DNA NEG Negative - -   OTHER HR HPV NEG Negative - -     PAP / HPV Latest Ref Rng & Units 3/16/2015 8/6/2012 12/20/2010   PAP - NIL NIL NIL   HPV 16 DNA NEG Negative - -   HPV 18 DNA NEG Negative - -   OTHER HR HPV NEG Negative - -     Reviewed and updated as needed this visit by clinical staff  Tobacco  Allergies  Meds  Problems  Med Hx  Surg Hx  Fam Hx  Soc Hx          Reviewed and updated as needed  "this visit by Provider  Tobacco  Allergies  Meds  Problems  Med Hx  Surg Hx  Fam Hx            Review of Systems   Constitutional: Negative for chills and fever.   HENT: Positive for congestion. Negative for ear pain, hearing loss and sore throat.    Eyes: Negative for pain and visual disturbance.   Respiratory: Positive for cough. Negative for shortness of breath.    Cardiovascular: Negative for chest pain, palpitations and peripheral edema.   Gastrointestinal: Negative for abdominal pain, constipation, diarrhea, heartburn, hematochezia and nausea.   Breasts:  Negative for tenderness, breast mass and discharge.   Genitourinary: Positive for genital sores. Negative for dysuria, frequency, hematuria, pelvic pain, urgency, vaginal bleeding and vaginal discharge.   Musculoskeletal: Positive for myalgias. Negative for arthralgias and joint swelling.   Skin: Negative for rash.   Neurological: Positive for headaches. Negative for dizziness, weakness and paresthesias.   Psychiatric/Behavioral: Negative for mood changes. The patient is not nervous/anxious.      See hpi for the positives      OBJECTIVE:   BP 96/72 (BP Location: Right arm, Patient Position: Chair, Cuff Size: Adult Regular)   Pulse 98   Temp 97.4  F (36.3  C) (Tympanic)   Ht 1.676 m (5' 6\")   Wt 65.9 kg (145 lb 3.2 oz)   LMP 01/05/2019 (LMP Unknown)   SpO2 99%   Breastfeeding? No   BMI 23.44 kg/m    Physical Exam  GENERAL: healthy, alert and no distress  EYES: Eyes grossly normal to inspection, PERRL and conjunctivae and sclerae normal  HENT: ear canals and TM's normal, nose and mouth without ulcers or lesions  NECK: no adenopathy, no asymmetry, masses, or scars and thyroid normal to palpation  RESP: lungs clear to auscultation - no rales, rhonchi or wheezes  BREAST: normal without masses, tenderness or nipple discharge and no palpable axillary masses or adenopathy  CV: regular rate and rhythm, normal S1 S2, no S3 or S4, no murmur, click or rub, " no peripheral edema and peripheral pulses strong  ABDOMEN: soft, nontender, no hepatosplenomegaly, no masses and bowel sounds normal   (female): normal female external genitalia, normal urethral meatus, vaginal mucosa pink, moist, well rugated, and normal cervix/adnexa/uterus without masses or discharge  RECTAL: external  Hemorrhoid mild erythema   MS: no gross musculoskeletal defects noted, no edema  Elbow: right tender right lat epiconcyle  Pain with resistance of ext of wrist and middle finger intact ROM no swelling or erythema  Knee: right full ROM no palp tender neg lachmans and mcmurrays intact colateral ligs  SKIN: no suspicious lesions or rashes  NEURO: Normal strength and tone, mentation intact and speech normal  PSYCH: mentation appears normal, affect normal/bright    Diagnostic Test Results:  Wet prep   Xray - Xray is reviewed independently by Vanessa Mackay MD and negative.     ASSESSMENT/PLAN:   1. Encounter for well adult exam with abnormal findings      2. Vaginal discharge  No infection reassurance   - Wet prep    3. Chronic pain of right knee  Unclear etiol maybe tendonitis   - XR Knee Right 1/2 Views; Future  - diclofenac (VOLTAREN) 1 % topical gel; Place 2 g onto the skin 4 times daily  Dispense: 100 g; Refill: 1    4. Right lateral epicondylitis    - diclofenac (VOLTAREN) 1 % topical gel; Place 2 g onto the skin 4 times daily  Dispense: 100 g; Refill: 1  - order for DME; Equipment being ordered: elbow band  Dispense: 1 each; Refill: 0    5. External hemorrhoids    Patient Instructions     Preventive Health Recommendations  Female Ages 26 - 39  Yearly exam:   See your health care provider every year in order to    Review health changes.     Discuss preventive care.      Review your medicines if you your doctor has prescribed any.    Until age 30: Get a Pap test every three years (more often if you have had an abnormal result).    After age 30: Talk to your doctor about whether you should have a  "Pap test every 3 years or have a Pap test with HPV screening every 5 years.   You do not need a Pap test if your uterus was removed (hysterectomy) and you have not had cancer.  You should be tested each year for STDs (sexually transmitted diseases), if you're at risk.   Talk to your provider about how often to have your cholesterol checked.  If you are at risk for diabetes, you should have a diabetes test (fasting glucose).  Shots: Get a flu shot each year. Get a tetanus shot every 10 years.   Nutrition:     Eat at least 5 servings of fruits and vegetables each day.    Eat whole-grain bread, whole-wheat pasta and brown rice instead of white grains and rice.    Get adequate Calcium and Vitamin D.     Lifestyle    Exercise at least 150 minutes a week (30 minutes a day, 5 days of the week). This will help you control your weight and prevent disease.    Limit alcohol to one drink per day.    No smoking.     Wear sunscreen to prevent skin cancer.    See your dentist every six months for an exam and cleaning.      Elbow pain   Try the gel 2-3 times a day   Use brace daily   Ice 2-3 times a day     Right knee pain   Xray right knee normal   Could try PT or the gel on your knee as well     Vaginal odor   Culture is negative   Try taking a probiotic     Hemorrhoids   Use tucks pads daily   Soaking in the tub will help         COUNSELING:  Reviewed preventive health counseling, as reflected in patient instructions    BP Readings from Last 1 Encounters:   01/18/19 96/72     Estimated body mass index is 23.44 kg/m  as calculated from the following:    Height as of this encounter: 1.676 m (5' 6\").    Weight as of this encounter: 65.9 kg (145 lb 3.2 oz).           reports that she has been smoking cigarettes.  She has been smoking about 0.25 packs per day. she has never used smokeless tobacco.  Tobacco Cessation Action Plan: Information offered: Patient not interested at this time    Counseling Resources:  ATP IV " Guidelines  Pooled Cohorts Equation Calculator  Breast Cancer Risk Calculator  FRAX Risk Assessment  ICSI Preventive Guidelines  Dietary Guidelines for Americans, 2010  [employer]'s MyPlate  ASA Prophylaxis  Lung CA Screening    Vanessa Mackay MD  [facility]

## 2019-01-24 LAB
COPATH REPORT: NORMAL
PAP: NORMAL

## 2019-01-25 LAB
FINAL DIAGNOSIS: NORMAL
HPV HR 12 DNA CVX QL NAA+PROBE: NEGATIVE
HPV16 DNA SPEC QL NAA+PROBE: NEGATIVE
HPV18 DNA SPEC QL NAA+PROBE: NEGATIVE
SPECIMEN DESCRIPTION: NORMAL
SPECIMEN SOURCE CVX/VAG CYTO: NORMAL

## 2019-08-14 ENCOUNTER — MYC MEDICAL ADVICE (OUTPATIENT)
Dept: FAMILY MEDICINE | Facility: CLINIC | Age: 39
End: 2019-08-14

## 2019-08-14 DIAGNOSIS — A60.04 HERPES SIMPLEX VULVOVAGINITIS: Primary | ICD-10-CM

## 2019-08-14 RX ORDER — VALACYCLOVIR HYDROCHLORIDE 1 G/1
1000 TABLET, FILM COATED ORAL DAILY
Qty: 5 TABLET | Refills: 0 | Status: SHIPPED | OUTPATIENT
Start: 2019-08-14 | End: 2019-11-15

## 2019-08-14 NOTE — TELEPHONE ENCOUNTER
I have ordered Valtrex 1000mg daily for 5 days for recurrent genital herpes.  Sent to [facility].  Recommend follow-up in clinic if any persistent symptoms.    Cesilia Metzger NP on 8/14/2019 at 2:29 PM

## 2019-08-14 NOTE — TELEPHONE ENCOUNTER
acyclovir (ZOVIRAX) 400 MG tablet (Discontinued) 180 tablet 3 1/15/2013 3/2/2013 --   Sig - Route: Take 1 tablet by mouth 2 times daily. - Oral   Class: E-Prescribe   Order: [id number]     valACYclovir (VALTREX) 500 MG tablet (Discontinued) 90 tablet 0 12/7/2016 1/24/2017 --   Sig - Route: Take 1 tablet (500 mg) by mouth daily - Oral   Class: E-Prescribe   Reason for Discontinue: Stop at Discharge   Order: [id number]   E-Prescribing Status: Receipt confirmed by pharmacy (12/7/2016 10:47 AM CST)

## 2019-11-04 ENCOUNTER — HEALTH MAINTENANCE LETTER (OUTPATIENT)
Age: 39
End: 2019-11-04

## 2019-11-15 ENCOUNTER — MYC MEDICAL ADVICE (OUTPATIENT)
Dept: FAMILY MEDICINE | Facility: CLINIC | Age: 39
End: 2019-11-15

## 2019-11-15 DIAGNOSIS — A60.04 HERPES SIMPLEX VULVOVAGINITIS: ICD-10-CM

## 2019-11-15 RX ORDER — VALACYCLOVIR HYDROCHLORIDE 1 G/1
1000 TABLET, FILM COATED ORAL DAILY
Qty: 5 TABLET | Refills: 3 | Status: SHIPPED | OUTPATIENT
Start: 2019-11-15 | End: 2020-12-18

## 2020-02-24 ASSESSMENT — ENCOUNTER SYMPTOMS
NAUSEA: 0
CONSTIPATION: 0
DIZZINESS: 0
HEMATURIA: 0
SHORTNESS OF BREATH: 0
HEADACHES: 1
FREQUENCY: 0
PALPITATIONS: 0
MYALGIAS: 1
WEAKNESS: 0
CHILLS: 0
FEVER: 0
BREAST MASS: 1
COUGH: 0
EYE PAIN: 0
ABDOMINAL PAIN: 0
SORE THROAT: 0
PARESTHESIAS: 0
NERVOUS/ANXIOUS: 0
DYSURIA: 0
DIARRHEA: 0
HEMATOCHEZIA: 0

## 2020-02-25 ENCOUNTER — OFFICE VISIT (OUTPATIENT)
Dept: FAMILY MEDICINE | Facility: CLINIC | Age: 40
End: 2020-02-25
Payer: COMMERCIAL

## 2020-02-25 VITALS
BODY MASS INDEX: 25.04 KG/M2 | TEMPERATURE: 98.5 F | OXYGEN SATURATION: 98 % | DIASTOLIC BLOOD PRESSURE: 76 MMHG | SYSTOLIC BLOOD PRESSURE: 106 MMHG | WEIGHT: 155.8 LBS | RESPIRATION RATE: 15 BRPM | HEART RATE: 79 BPM | HEIGHT: 66 IN

## 2020-02-25 DIAGNOSIS — F17.200 TOBACCO USE DISORDER: ICD-10-CM

## 2020-02-25 DIAGNOSIS — Z00.00 ROUTINE GENERAL MEDICAL EXAMINATION AT A HEALTH CARE FACILITY: Primary | ICD-10-CM

## 2020-02-25 DIAGNOSIS — N63.0 LUMP OR MASS IN BREAST: ICD-10-CM

## 2020-02-25 PROCEDURE — 99395 PREV VISIT EST AGE 18-39: CPT | Performed by: FAMILY MEDICINE

## 2020-02-25 ASSESSMENT — MIFFLIN-ST. JEOR: SCORE: 1398.45

## 2020-02-25 ASSESSMENT — ENCOUNTER SYMPTOMS
DYSURIA: 0
DIZZINESS: 0
HEADACHES: 1
COUGH: 0
BREAST MASS: 1
NERVOUS/ANXIOUS: 0
CONSTIPATION: 0
SORE THROAT: 0
CHILLS: 0
MYALGIAS: 1
NAUSEA: 0
SHORTNESS OF BREATH: 0
DIARRHEA: 0
FREQUENCY: 0
FEVER: 0
ABDOMINAL PAIN: 0
PARESTHESIAS: 0
HEMATURIA: 0
WEAKNESS: 0
EYE PAIN: 0
PALPITATIONS: 0
HEMATOCHEZIA: 0

## 2020-02-25 NOTE — PROGRESS NOTES
SUBJECTIVE:   CC: Judi Wayne is an 39 year old woman who presents for preventive health visit.     Healthy Habits:     Getting at least 3 servings of Calcium per day:  Yes    Bi-annual eye exam:  Yes    Dental care twice a year:  Yes    Sleep apnea or symptoms of sleep apnea:  Daytime drowsiness    Diet:  Regular (no restrictions)    Frequency of exercise:  None    Taking medications regularly:  Not Applicable    Medication side effects:  Not applicable    PHQ-2 Total Score: 1    Additional concerns today:  No      Lump under right breast for 1 month. Tender at first but not anymore  No discharge from the nipple   No FH breast ca     Today's PHQ-2 Score:   PHQ-2 ( 1999 [employer]) 2/24/2020   Q1: Little interest or pleasure in doing things 1   Q2: Feeling down, depressed or hopeless 0   PHQ-2 Score 1   Q1: Little interest or pleasure in doing things Several days   Q2: Feeling down, depressed or hopeless Not at all   PHQ-2 Score 1       Abuse: Current or Past(Physical, Sexual or Emotional)- No  Do you feel safe in your environment? Yes        Social History     Tobacco Use     Smoking status: Current Every Day Smoker     Packs/day: 0.25     Types: Cigarettes     Smokeless tobacco: Never Used     Tobacco comment: smoking 15cig/day- down to 10cig/day with pregnancy   Substance Use Topics     Alcohol use: No     Alcohol/week: 0.0 standard drinks     Comment: occas- quit with pregnancy         No flowsheet data found.    Reviewed orders with patient.  Reviewed health maintenance and updated orders accordingly - Yes  Labs reviewed in EPIC        Pertinent mammograms are reviewed under the imaging tab.  History of abnormal Pap smear:   NO - age 30-65 PAP every 5 years with negative HPV co-testing recommended  Last 3 Pap and HPV Results:   PAP / HPV Latest Ref Rng & Units 1/18/2019 3/16/2015 8/6/2012   PAP - NIL NIL NIL   HPV 16 DNA NEG:Negative Negative Negative -   HPV 18 DNA NEG:Negative Negative Negative -   OTHER  "HR HPV NEG:Negative Negative Negative -     PAP / HPV Latest Ref Rng & Units 1/18/2019 3/16/2015 8/6/2012   PAP - NIL NIL NIL   HPV 16 DNA NEG:Negative Negative Negative -   HPV 18 DNA NEG:Negative Negative Negative -   OTHER HR HPV NEG:Negative Negative Negative -     Reviewed and updated as needed this visit by clinical staff  Tobacco  Allergies  Meds  Problems  Med Hx  Surg Hx  Fam Hx         Reviewed and updated as needed this visit by Provider  Tobacco  Allergies  Meds  Problems  Med Hx  Surg Hx  Fam Hx            Review of Systems   Constitutional: Negative for chills and fever.   HENT: Negative for congestion, ear pain and sore throat.    Eyes: Negative for pain and visual disturbance.   Respiratory: Negative for cough and shortness of breath.    Cardiovascular: Negative for chest pain and palpitations.   Gastrointestinal: Negative for abdominal pain, constipation, diarrhea, hematochezia and nausea.   Breasts:  Positive for breast mass. Negative for tenderness and discharge.   Genitourinary: Negative for dysuria, frequency, genital sores, hematuria, pelvic pain, urgency, vaginal bleeding and vaginal discharge.   Musculoskeletal: Positive for myalgias.   Skin: Negative for rash.   Neurological: Positive for headaches. Negative for dizziness, weakness and paresthesias.   Psychiatric/Behavioral: Positive for mood changes. The patient is not nervous/anxious.           OBJECTIVE:   /76 (BP Location: Right arm, Patient Position: Chair, Cuff Size: Adult Regular)   Pulse 79   Temp 98.5  F (36.9  C) (Tympanic)   Resp 15   Ht 1.676 m (5' 6\")   Wt 70.7 kg (155 lb 12.8 oz)   LMP 02/17/2020 (Approximate)   SpO2 98%   Breastfeeding No   BMI 25.15 kg/m    Physical Exam  GENERAL: healthy, alert and no distress  EYES: Eyes grossly normal to inspection, PERRL and conjunctivae and sclerae normal  HENT: ear canals and TM's normal, nose and mouth without ulcers or lesions  NECK: no adenopathy, no " "asymmetry, masses, or scars and thyroid normal to palpation  RESP: lungs clear to auscultation - no rales, rhonchi or wheezes  BREAST: mass right breast 7oclock non tender and 2-3 mm firm and mobile   CV: regular rate and rhythm, normal S1 S2, no S3 or S4, no murmur, click or rub, no peripheral edema and peripheral pulses strong  ABDOMEN: soft, nontender, no hepatosplenomegaly, no masses and bowel sounds normal  MS: no gross musculoskeletal defects noted, no edema  SKIN: no suspicious lesions or rashes  NEURO: Normal strength and tone, mentation intact and speech normal  PSYCH: mentation appears normal, affect normal/bright    Diagnostic Test Results:  Labs reviewed in Epic    ASSESSMENT/PLAN:   1. Routine general medical examination at a health care facility      2. Tobacco use disorder    - nicotine (NICORETTE) 2 MG gum; Place 1 each (2 mg) inside cheek as needed for smoking cessation  Dispense: 100 each; Refill: 1    3. Lump or mass in breast    - MA Diagnostic Digital Bilateral; Future    COUNSELING:  Reviewed preventive health counseling, as reflected in patient instructions    Estimated body mass index is 25.15 kg/m  as calculated from the following:    Height as of this encounter: 1.676 m (5' 6\").    Weight as of this encounter: 70.7 kg (155 lb 12.8 oz).         reports that she has been smoking cigarettes. She has been smoking about 0.25 packs per day. She has never used smokeless tobacco.  Tobacco Cessation Action Plan: Pharmacotherapies : other Nicotine replacement    Counseling Resources:  ATP IV Guidelines  Pooled Cohorts Equation Calculator  Breast Cancer Risk Calculator  FRAX Risk Assessment  ICSI Preventive Guidelines  Dietary Guidelines for Americans, 2010  USDA's MyPlate  ASA Prophylaxis  Lung CA Screening    Vanessa Mackay MD  [facility]  "

## 2020-02-25 NOTE — NURSING NOTE
"Chief Complaint   Patient presents with     Physical       Initial /76 (BP Location: Right arm, Patient Position: Chair, Cuff Size: Adult Regular)   Pulse 79   Temp 98.5  F (36.9  C) (Tympanic)   Resp 15   Ht 1.676 m (5' 6\")   Wt 70.7 kg (155 lb 12.8 oz)   LMP 02/17/2020 (Approximate)   SpO2 98%   Breastfeeding No   BMI 25.15 kg/m   Estimated body mass index is 25.15 kg/m  as calculated from the following:    Height as of this encounter: 1.676 m (5' 6\").    Weight as of this encounter: 70.7 kg (155 lb 12.8 oz).    Patient presents to the clinic using No DME    Health Maintenance that is potentially due pending provider review:  NONE    n/a    Is there anyone who you would like to be able to receive your results? No  If yes have patient fill out AVERY    "

## 2020-03-10 ENCOUNTER — MYC MEDICAL ADVICE (OUTPATIENT)
Dept: FAMILY MEDICINE | Facility: CLINIC | Age: 40
End: 2020-03-10

## 2020-03-13 ENCOUNTER — HOSPITAL ENCOUNTER (OUTPATIENT)
Dept: ULTRASOUND IMAGING | Facility: CLINIC | Age: 40
End: 2020-03-13
Attending: FAMILY MEDICINE
Payer: COMMERCIAL

## 2020-03-13 ENCOUNTER — HOSPITAL ENCOUNTER (OUTPATIENT)
Dept: MAMMOGRAPHY | Facility: CLINIC | Age: 40
End: 2020-03-13
Attending: FAMILY MEDICINE
Payer: COMMERCIAL

## 2020-03-13 DIAGNOSIS — N63.0 LUMP OR MASS IN BREAST: ICD-10-CM

## 2020-03-13 PROCEDURE — 77066 DX MAMMO INCL CAD BI: CPT

## 2020-03-13 PROCEDURE — 76642 ULTRASOUND BREAST LIMITED: CPT | Mod: RT

## 2020-03-18 ENCOUNTER — HOSPITAL ENCOUNTER (OUTPATIENT)
Dept: MAMMOGRAPHY | Facility: CLINIC | Age: 40
End: 2020-03-18
Attending: FAMILY MEDICINE
Payer: COMMERCIAL

## 2020-03-18 ENCOUNTER — HOSPITAL ENCOUNTER (OUTPATIENT)
Dept: ULTRASOUND IMAGING | Facility: CLINIC | Age: 40
End: 2020-03-18
Attending: FAMILY MEDICINE
Payer: COMMERCIAL

## 2020-03-18 VITALS — DIASTOLIC BLOOD PRESSURE: 86 MMHG | SYSTOLIC BLOOD PRESSURE: 129 MMHG

## 2020-03-18 DIAGNOSIS — N63.10 BREAST MASS, RIGHT: ICD-10-CM

## 2020-03-18 PROCEDURE — 27211116 US BREAST BIOPSY CORE NEEDLE RIGHT

## 2020-03-18 PROCEDURE — 40000986 MA POST PROCEDURE RIGHT

## 2020-03-18 PROCEDURE — 88305 TISSUE EXAM BY PATHOLOGIST: CPT | Performed by: RADIOLOGY

## 2020-03-18 PROCEDURE — 25000125 ZZHC RX 250: Performed by: RADIOLOGY

## 2020-03-18 PROCEDURE — 88305 TISSUE EXAM BY PATHOLOGIST: CPT | Mod: 26 | Performed by: RADIOLOGY

## 2020-03-18 RX ADMIN — LIDOCAINE HYDROCHLORIDE 8 ML: 10 INJECTION, SOLUTION EPIDURAL; INFILTRATION; INTRACAUDAL; PERINEURAL at 10:03

## 2020-03-18 NOTE — IP AVS SNAPSHOT
[facility] Ultrasound  [address] [place] [address]  Phone:  [phone]                                    After Visit Summary   3/18/2020    Judi Wayne    MRN: [id number]           After Visit Summary Signature Page    I have received my discharge instructions, and my questions have been answered. I have discussed any challenges I see with this plan with the nurse or doctor.    ..........................................................................................................................................  Patient/Patient Representative Signature      ..........................................................................................................................................  Patient Representative Print Name and Relationship to Patient    ..................................................               ................................................  Date                                   Time    ..........................................................................................................................................  Reviewed by Signature/Title    ...................................................              ..............................................  Date                                               Time          22EPIC Rev 08/18

## 2020-03-18 NOTE — DISCHARGE INSTRUCTIONS
Breast Biopsy Care Instructions      Site Care:    You may have some discomfort in the breast and may see some bruising at the needle site.    You will be given an ice pack which should be kept in place over the dressing until bedtime tonight.Always keep a gauze pad between your skin and the ice pack.    Wearing your bra continuously for 24 hours post biopsy will give optimal support to the biopsy site.    Activity:       You may go back to your normal routine.     No heavy lifting for 24 hours.    Diet and Medications:       You may go back to your regular diet.     Tylenol is the best choice to relieve your discomfort, the first 24 hours. If you have no bleeding on the first day, Ibuprofen (such as Advil, or Motrin), may be taken on the second day after for pain.     Do not take aspirin for 72 hours following your biopsy.    Questions:     If you have any questions about your procedure performed in Ultrasound, you may contact us at [phone].If you have any questions about your procedure performed in Mammography, you may contact us at [phone].    Results:       The pathology report on your biopsy is usually available within 72 hours. The Radiologist or your personal physician will call you with the results.     You will receive information about any further follow up from your physician.    Call your doctor if you have:       More than slight bleeding or significant pain, or experience swelling of the breast.     If your physician is unavailable, please call the Nurse Advice Line at [phone], or [facility] Emergency Department at [phone].      Patient:______________________________  Time:_________  Date:_____________    Instructor:____________________________   Time:_________  Date:_____________

## 2020-03-18 NOTE — PROGRESS NOTES
RADIOLOGY PROCEDURE NOTE  Patient name: Judi Wayne  MRN: [id number]  : 1980    Pre-procedure diagnosis: Right breast lesion.  Post-procedure diagnosis: Same    Procedure Date/Time: 2020  10:11 AM  Procedure: US guided needle core biopsy.  Estimated blood loss: None  Specimen(s) collected with description: Three needle cores.  The patient tolerated the procedure well with no immediate complications.    See imaging dictation for procedural details.    Provider name: Sheldon Valdez MD  Assistant(s):None

## 2020-03-20 LAB — COPATH REPORT: NORMAL

## 2020-11-22 ENCOUNTER — HEALTH MAINTENANCE LETTER (OUTPATIENT)
Age: 40
End: 2020-11-22

## 2020-12-18 ENCOUNTER — OFFICE VISIT (OUTPATIENT)
Dept: PODIATRY | Facility: CLINIC | Age: 40
End: 2020-12-18
Payer: COMMERCIAL

## 2020-12-18 VITALS
WEIGHT: 155 LBS | SYSTOLIC BLOOD PRESSURE: 126 MMHG | DIASTOLIC BLOOD PRESSURE: 87 MMHG | HEART RATE: 60 BPM | HEIGHT: 66 IN | BODY MASS INDEX: 24.91 KG/M2

## 2020-12-18 DIAGNOSIS — L60.0 INGROWN NAIL OF GREAT TOE OF LEFT FOOT: ICD-10-CM

## 2020-12-18 DIAGNOSIS — M79.675 PAIN IN TOES OF BOTH FEET: ICD-10-CM

## 2020-12-18 DIAGNOSIS — M79.674 PAIN IN TOES OF BOTH FEET: ICD-10-CM

## 2020-12-18 DIAGNOSIS — L60.0 INGROWN NAIL OF GREAT TOE OF RIGHT FOOT: Primary | ICD-10-CM

## 2020-12-18 PROCEDURE — 99203 OFFICE O/P NEW LOW 30 MIN: CPT | Performed by: PODIATRIST

## 2020-12-18 ASSESSMENT — MIFFLIN-ST. JEOR: SCORE: 1389.83

## 2020-12-18 NOTE — PATIENT INSTRUCTIONS
- What is an ingrown toenail? An ingrown toenail is a medical condition usually caused by a nail edge irritating the neighboring soft tissue and skin. It can cause pain and lead to infection.  - What causes ingrown toenails? There are likely multiple causes of ingrown toenails, including nail shape and width, narrow shoes, a person s activity level, and likely family history of nail problems.  - Risks of not treating condition: If left untreated, some people endure ongoing tenderness and pain. The biggest concern is infection from bacteria entering through the damage soft tissue.  - How is it treated? Some people achieve relief by soaking their feet and trimming the edge of the nail. If an infection has developed, then an oral antibiotic can be prescribed, but the condition often returns after the course of the medication is completed. Often self treatment is not successful and treatment by a qualified medical provider is needed. This often involves numbing the toe with a local anesthetic and then either removing the edge of a nail or the entire nail.  - Risks of surgery? As with any form of surgery, infections is a risk. However, a person is probably at greater risk for infection if the ingrown toenail is left untreated. Nail removal is a common, simple, and fairly quick procedure that in most cases is done in the physician's office. If an infection exists, often the only treatment that is successful is removal.      SMOKING CESSATION  What's in cigarette smoke? - Cigarette smoke contains over 4,000 chemicals. Nicotine is one of the main ingredients which is an insecticide/herbicide. It is poisonous to our nervous system, increases blood clotting risk, and decreases the body's defenses to fight off infection. Another chemical is Carbon Monoxide is an asphyxiating gas that permanently binds to blood cells and blocks the transport of oxygen. This leads to tissue death and decreases your metabolism. Tar is a chemical  that coats your lungs and trachea which impairs new oxygen coming in and carbon dioxide getting out of your body.   How does smoking impact surgery? - Smoking is particularly hazardous with regards to surgery. Surgery puts stress on the body and a smoker's body is already under strain from these chemicals. Putting the two together, especially for an elective surgery, could be a recipe for disaster. Smoking before and after surgery increases your risk of heart problems, slow wound healing, delayed bone healing, blood clots, wound infection and anesthesia complications.   What are the benefits of quitting? - In 20 minutes your blood pressure will drop back down to normal. In 8 hours the carbon monoxide (a toxic gas) levels in your blood stream will drop by half, and oxygen levels will return to normal. In 48 hours your chance of having a heart attack will have decreased. All nicotine will have left your body. Your sense of taste and smell will return to a normal level. In 72 hours your bronchial tubes will relax, and your energy levels will increase. In 2 weeks your circulation will increase, and it will continue to improve for the next 10 weeks.    Recommendations for elective surgery - Ideally, patients should quit smoking 8 weeks before and at least 2 weeks after elective surgery in order to avoid complications. Simply cutting back on the amount of cigarettes smoked per day does not offer any benefit or decrease the risk of poor wound healing, heart problems, and infection. Smokers should also start taking Vitamin C and B for two weeks before surgery and two weeks after surgery.    Ways to Stop Smokin. Nicotine patches, lozenges, or gum  2. Support Groups  3. Medications (see below)    List of Medications:  1. Varenicline Tartrate (CHANTIX)   2. Bupropion HCL (WELLBUTRIN, ZYBAN) - note: make sure Wellbutrin is for smoking cessation and not other issues   3. Nicotine Patch (NICODERM)   4. Nicotine Inhaler  (NICOTROL)   5. Nicotine Gum Nicotine Polacrilex   6. Nicotine Lozenge: Nicotine Polacrilex (COMMIT)   * [employer] offers a smoking support group as well!  Please visit: [email or web address]  If you are interested in these, ask about getting a prescription or talk to your primary care doctor about what may be the best way for you to quit.
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
## 2020-12-18 NOTE — NURSING NOTE
"Chief Complaint   Patient presents with     Consult     BL great toenails-possible fungal /ingrown       Initial /87   Pulse 60   Ht 1.676 m (5' 6\")   Wt 70.3 kg (155 lb)   BMI 25.02 kg/m   Estimated body mass index is 25.02 kg/m  as calculated from the following:    Height as of this encounter: 1.676 m (5' 6\").    Weight as of this encounter: 70.3 kg (155 lb).  Medications and allergies reviewed.      Annia BURGOS MA    "
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
## 2020-12-21 ENCOUNTER — OFFICE VISIT (OUTPATIENT)
Dept: PODIATRY | Facility: CLINIC | Age: 40
End: 2020-12-21
Payer: COMMERCIAL

## 2020-12-21 VITALS
HEART RATE: 62 BPM | BODY MASS INDEX: 24.91 KG/M2 | DIASTOLIC BLOOD PRESSURE: 87 MMHG | WEIGHT: 155 LBS | SYSTOLIC BLOOD PRESSURE: 123 MMHG | HEIGHT: 66 IN

## 2020-12-21 DIAGNOSIS — L60.0 INGROWN NAIL OF GREAT TOE OF RIGHT FOOT: Primary | ICD-10-CM

## 2020-12-21 DIAGNOSIS — L60.0 INGROWN NAIL OF GREAT TOE OF LEFT FOOT: ICD-10-CM

## 2020-12-21 PROCEDURE — 11750 EXCISION NAIL&NAIL MATRIX: CPT | Mod: T5 | Performed by: PODIATRIST

## 2020-12-21 PROCEDURE — 11730 AVULSION NAIL PLATE SIMPLE 1: CPT | Mod: 59 | Performed by: PODIATRIST

## 2020-12-21 ASSESSMENT — MIFFLIN-ST. JEOR: SCORE: 1389.83

## 2020-12-21 NOTE — PROGRESS NOTES
"Judi returns to the office for ingrown toenail procedures on both great toes.  The patient was recently seen in clinic for her painful ingrown toenails.  The patient wishes to have the left great toenail removed entirely and the size of the right great toenail removed permanently.    EXAM:Vitals: /87   Pulse 62   Ht 1.676 m (5' 6\")   Wt 70.3 kg (155 lb)   BMI 25.02 kg/m            General appearance: Patient is alert and fully cooperative with history & exam.  No sign of distress is noted during the visit.     Psychiatric: Affect is pleasant & appropriate.  Patient appears motivated to improve health.     Respiratory: Breathing is regular & unlabored while sitting.     HEENT: Hearing is intact to spoken word.  Speech is clear.  No gross evidence of visual impairment that would impact ambulation.     Dermatologic: One notes severe incurvation of the left great toenail with pain on palpation around the entire nail plate.  No surrounding redness or drainage noted.  Noted slightly less incurvated nail in the right great toenail with pain on palpation of the medial lateral nail edges.  No surrounding erythema or edema noted.    Vascular: DP & PT pulses are intact & regular bilaterally.  No significant edema or varicosities noted.  CFT and skin temperature is normal to both lower extremities.     Neurologic: Lower extremity sensation is intact to light touch.  No evidence of weakness or contracture in the lower extremities.        Musculoskeletal: Patient is ambulatory without assistive device or brace.  No gross ankle deformity noted.  No foot or ankle joint effusion is noted.    Assessment:  1.  Paranychia of the right and left great toe.      Plan:  I have explained to Judi about the condition.  The potential causes and nature of an ingrown toenail were discussed with the patient.  We reviewed the natural history and prognosis of the condition and potential risks if no treatment is provided.  Treatment " options discussed included conservative management (oral antibiotics, soaking of foot, adequate width shoes)  as well as surgical management (partial or total nail removal).  The pros and cons of both forms as well as risks and benefits of treatment were reviewed.       At this point, I recommended having the offending nail border permanently removed on the right great toe and to have the nail plate removed on the left great toe.  I have explained to the patient all of the possible risks, benefits, alternatives to the procedure.  The patient consented to the proposed procedure.  The right and left hallux was swabbed with alcohol.  Next, approximately 5 cc of 1% lidocaine plain was injected around the right and left hallux.  The right hallux was then prepped with Betadine ointment.  A tourniquet was applied to the left and right hallux.  The offending nail border was split using an English anvil back to the matrix.  Next, utilizing a straight hemostat the offending nail border was avulsed with the attached matrix on the right great toe.  The wound bed was debrided on any remaining nail, matrix and skin.  Next, the offending nail border was treated with 89% phenol using microtip cotton applicators for 30 seconds on the right.  On the left great toe the entire nail plate was avulsed in total.  The wound was then irrigated and dressed with Triple antibiotic ointment and a compressive dry sterile dressing.  The tourniquet was removed and a prompt hyperemic response was noted.  The patient tolerated the procedure well with no complications.  The patient was given post procedure instructions for the care of the wound.  The patient was informed that it is common to experience redness with watery drainage coming from the treated areas of the toe related to the phenol application.  The patient was instructed to notify the office if any redness extending past the big toe joint or fever with chills are experienced.      There  is low risk of morbidity with the procedure.  There was no overlap in work associated with the evaluation/management and the work associated with the procedure.    Judi verbalized agreement with and understanding of the rational for the diagnosis and treatment plan.  All questions were answered to best of my ability and the patient's satisfaction. The patient was advised to contact the clinic with any questions that may arise after the clinic visit.      Disclaimer: This note consists of symbols derived from keyboarding, dictation and/or voice recognition software. As a result, there may be errors in the script that have gone undetected. Please consider this when interpreting information found in this chart.      USAMA Mireles D.P.M., [email or web address].

## 2020-12-21 NOTE — NURSING NOTE
"Chief Complaint   Patient presents with     Ingrown Toenail     BL ingrown toenail       Initial /87   Pulse 62   Ht 1.676 m (5' 6\")   Wt 70.3 kg (155 lb)   BMI 25.02 kg/m   Estimated body mass index is 25.02 kg/m  as calculated from the following:    Height as of this encounter: 1.676 m (5' 6\").    Weight as of this encounter: 70.3 kg (155 lb).  Medications and allergies reviewed.      Annia BURGOS MA    "

## 2020-12-21 NOTE — PATIENT INSTRUCTIONS
Post toenail procedure instructions:    1. Keep bandages on for the rest of the day; take off this evening.  2. Soak the foot and toe in warm water with Epsom's salt or Dreft detergent for 20 min.  3. Clean the toe and the treated area with a soapy wash cloth.  4. Apply a topical antibiotic (Bacitracin, Neosporin or triple antibiotic) to the treated area and cover with a Band-Aid  5. Repeat this morning and night for two weeks, or until the treated are resolves any redness or drainage.  a. Redness and drainage is normal when treated with the Phenol acid.  b. Notify the office if there is any redness extending up the foot or purulent drainage noted.    Any discomfort should be treated with either Ibuprofen (Advil) or Tylenol.  Please follow package instructions on amount to be taken.      SMOKING CESSATION  What's in cigarette smoke? - Cigarette smoke contains over 4,000 chemicals. Nicotine is one of the main ingredients which is an insecticide/herbicide. It is poisonous to our nervous system, increases blood clotting risk, and decreases the body's defenses to fight off infection. Another chemical is Carbon Monoxide is an asphyxiating gas that permanently binds to blood cells and blocks the transport of oxygen. This leads to tissue death and decreases your metabolism. Tar is a chemical that coats your lungs and trachea which impairs new oxygen coming in and carbon dioxide getting out of your body.   How does smoking impact surgery? - Smoking is particularly hazardous with regards to surgery. Surgery puts stress on the body and a smoker's body is already under strain from these chemicals. Putting the two together, especially for an elective surgery, could be a recipe for disaster. Smoking before and after surgery increases your risk of heart problems, slow wound healing, delayed bone healing, blood clots, wound infection and anesthesia complications.   What are the benefits of quitting? - In 20 minutes your blood  pressure will drop back down to normal. In 8 hours the carbon monoxide (a toxic gas) levels in your blood stream will drop by half, and oxygen levels will return to normal. In 48 hours your chance of having a heart attack will have decreased. All nicotine will have left your body. Your sense of taste and smell will return to a normal level. In 72 hours your bronchial tubes will relax, and your energy levels will increase. In 2 weeks your circulation will increase, and it will continue to improve for the next 10 weeks.    Recommendations for elective surgery - Ideally, patients should quit smoking 8 weeks before and at least 2 weeks after elective surgery in order to avoid complications. Simply cutting back on the amount of cigarettes smoked per day does not offer any benefit or decrease the risk of poor wound healing, heart problems, and infection. Smokers should also start taking Vitamin C and B for two weeks before surgery and two weeks after surgery.    Ways to Stop Smokin. Nicotine patches, lozenges, or gum  2. Support Groups  3. Medications (see below)    List of Medications:  1. Varenicline Tartrate (CHANTIX)   2. Bupropion HCL (WELLBUTRIN, ZYBAN) - note: make sure Wellbutrin is for smoking cessation and not other issues   3. Nicotine Patch (NICODERM)   4. Nicotine Inhaler (NICOTROL)   5. Nicotine Gum Nicotine Polacrilex   6. Nicotine Lozenge: Nicotine Polacrilex (COMMIT)   * [place] offers a smoking support group as well!  Please visit: [email or web address]  If you are interested in these, ask about getting a prescription or talk to your primary care doctor about what may be the best way for you to quit.

## 2020-12-21 NOTE — LETTER
"    12/21/2020         RE: Judi Wayne  [address] [facility] [address]        Dear Colleague,    Thank you for referring your patient, Judi Wayne, to the [facility] [place]. Please see a copy of my visit note below.    Judi returns to the office for ingrown toenail procedures on both great toes.  The patient was recently seen in clinic for her painful ingrown toenails.  The patient wishes to have the left great toenail removed entirely and the size of the right great toenail removed permanently.    EXAM:Vitals: /87   Pulse 62   Ht 1.676 m (5' 6\")   Wt 70.3 kg (155 lb)   BMI 25.02 kg/m            General appearance: Patient is alert and fully cooperative with history & exam.  No sign of distress is noted during the visit.     Psychiatric: Affect is pleasant & appropriate.  Patient appears motivated to improve health.     Respiratory: Breathing is regular & unlabored while sitting.     HEENT: Hearing is intact to spoken word.  Speech is clear.  No gross evidence of visual impairment that would impact ambulation.     Dermatologic: One notes severe incurvation of the left great toenail with pain on palpation around the entire nail plate.  No surrounding redness or drainage noted.  Noted slightly less incurvated nail in the right great toenail with pain on palpation of the medial lateral nail edges.  No surrounding erythema or edema noted.    Vascular: DP & PT pulses are intact & regular bilaterally.  No significant edema or varicosities noted.  CFT and skin temperature is normal to both lower extremities.     Neurologic: Lower extremity sensation is intact to light touch.  No evidence of weakness or contracture in the lower extremities.        Musculoskeletal: Patient is ambulatory without assistive device or brace.  No gross ankle deformity noted.  No foot or ankle joint effusion is noted.    Assessment:  1.  Paranychia of the right and left great toe.      Plan:  I " have explained to Judi about the condition.  The potential causes and nature of an ingrown toenail were discussed with the patient.  We reviewed the natural history and prognosis of the condition and potential risks if no treatment is provided.  Treatment options discussed included conservative management (oral antibiotics, soaking of foot, adequate width shoes)  as well as surgical management (partial or total nail removal).  The pros and cons of both forms as well as risks and benefits of treatment were reviewed.       At this point, I recommended having the offending nail border permanently removed on the right great toe and to have the nail plate removed on the left great toe.  I have explained to the patient all of the possible risks, benefits, alternatives to the procedure.  The patient consented to the proposed procedure.  The right and left hallux was swabbed with alcohol.  Next, approximately 5 cc of 1% lidocaine plain was injected around the right and left hallux.  The right hallux was then prepped with Betadine ointment.  A tourniquet was applied to the left and right hallux.  The offending nail border was split using an English anvil back to the matrix.  Next, utilizing a straight hemostat the offending nail border was avulsed with the attached matrix on the right great toe.  The wound bed was debrided on any remaining nail, matrix and skin.  Next, the offending nail border was treated with 89% phenol using microtip cotton applicators for 30 seconds on the right.  On the left great toe the entire nail plate was avulsed in total.  The wound was then irrigated and dressed with Triple antibiotic ointment and a compressive dry sterile dressing.  The tourniquet was removed and a prompt hyperemic response was noted.  The patient tolerated the procedure well with no complications.  The patient was given post procedure instructions for the care of the wound.  The patient was informed that it is common to  experience redness with watery drainage coming from the treated areas of the toe related to the phenol application.  The patient was instructed to notify the office if any redness extending past the big toe joint or fever with chills are experienced.      There is low risk of morbidity with the procedure.  There was no overlap in work associated with the evaluation/management and the work associated with the procedure.    Judi verbalized agreement with and understanding of the rational for the diagnosis and treatment plan.  All questions were answered to best of my ability and the patient's satisfaction. The patient was advised to contact the clinic with any questions that may arise after the clinic visit.      Disclaimer: This note consists of symbols derived from keyboarding, dictation and/or voice recognition software. As a result, there may be errors in the script that have gone undetected. Please consider this when interpreting information found in this chart.      USAMA Mireles D.P.M., F.A.C.F.A.S.          Again, thank you for allowing me to participate in the care of your patient.        Sincerely,        Troy Mireles DPM

## 2021-04-04 ENCOUNTER — HEALTH MAINTENANCE LETTER (OUTPATIENT)
Age: 41
End: 2021-04-04

## 2021-09-07 ENCOUNTER — ANCILLARY PROCEDURE (OUTPATIENT)
Dept: GENERAL RADIOLOGY | Facility: CLINIC | Age: 41
End: 2021-09-07
Attending: PHYSICIAN ASSISTANT
Payer: COMMERCIAL

## 2021-09-07 ENCOUNTER — OFFICE VISIT (OUTPATIENT)
Dept: FAMILY MEDICINE | Facility: CLINIC | Age: 41
End: 2021-09-07
Payer: COMMERCIAL

## 2021-09-07 VITALS
SYSTOLIC BLOOD PRESSURE: 112 MMHG | DIASTOLIC BLOOD PRESSURE: 94 MMHG | BODY MASS INDEX: 23.89 KG/M2 | RESPIRATION RATE: 18 BRPM | WEIGHT: 152.2 LBS | HEIGHT: 67 IN | HEART RATE: 74 BPM | TEMPERATURE: 97.8 F

## 2021-09-07 DIAGNOSIS — M25.572 PAIN IN JOINT, ANKLE AND FOOT, LEFT: ICD-10-CM

## 2021-09-07 DIAGNOSIS — M25.572 PAIN IN JOINT, ANKLE AND FOOT, LEFT: Primary | ICD-10-CM

## 2021-09-07 PROCEDURE — 73610 X-RAY EXAM OF ANKLE: CPT | Mod: LT | Performed by: RADIOLOGY

## 2021-09-07 PROCEDURE — 73630 X-RAY EXAM OF FOOT: CPT | Mod: LT | Performed by: RADIOLOGY

## 2021-09-07 PROCEDURE — 99214 OFFICE O/P EST MOD 30 MIN: CPT | Performed by: PHYSICIAN ASSISTANT

## 2021-09-07 ASSESSMENT — PAIN SCALES - GENERAL: PAINLEVEL: MILD PAIN (3)

## 2021-09-07 ASSESSMENT — MIFFLIN-ST. JEOR: SCORE: 1384.03

## 2021-09-07 NOTE — PROGRESS NOTES
"Assessment & Plan   Pain in joint, ankle and foot, left  Patient presents with 4 days of traumatic L ankle pain.    History and exam are concerning for possible fracture.  Differential also includes strain, sprain, hematoma.  X-ray of the area demonstrates no evidence of fracture or disloaction, per my read.  Most likely this represents an ankle sprain.  Recommended conservative therapies including rest/immobilization, ice, elevation, NSAIDs.   As pain recedes, begin normal activities slowly as tolerated. Consider Physical Therapy if symptoms not better with symptomatic care.   Return to clinic in 4 weeks if symptoms not significantly improved, or sooner for any new changing or worsening symptoms.  - XR Ankle Left G/E 3 Views; Future  - XR Foot Left G/E 3 Views; Future     Tobacco Cessation:   reports that she has been smoking cigarettes. She has been smoking about 0.25 packs per day. She has never used smokeless tobacco.    Return in about 4 weeks (around 10/5/2021), or if symptoms worsen or fail to improve, for In-Clinic Visit.    Jarett Pemberton PA-C  [facility]    Brooke Lau is a 41 year old who presents for the following health issues     HPI   Concern - Foot swelling/infection   Onset: 5  Days   Description: States that she rolled her ankle Thursday. Swelling has improved a little.   Intensity: moderate  Progression of Symptoms:  improving and worsening  Accompanying Signs & Symptoms: Redness, pain and swelling, warm to touch   Previous history of similar problem: no  Precipitating factors:        Worsened by: na   Alleviating factors:        Improved by: ice and elevation   Therapies tried and outcome: Ice, elevation     Review of Systems   See HPI       Objective    BP (!) 112/94 (BP Location: Right arm, Patient Position: Sitting, Cuff Size: Adult Regular)   Pulse 74   Temp 97.8  F (36.6  C) (Tympanic)   Resp 18   Ht 1.695 m (5' 6.75\")   Wt 69 kg (152 lb 3.2 oz)   " BMI 24.02 kg/m    Body mass index is 24.02 kg/m .  Physical Exam   Constitutional: healthy, alert, and no distress  Head: Normocephalic. Atraumatic  Eyes: No conjunctival injection, sclera anicteric  Respiratory: No resp distress.  Musculoskeletal: Localized swelling over the lateral aspect of the L ankle and midfoot. TTP over the ATFL, and left lateral midfoot. FROM of the L ankle.   Neurologic: Gait normal. CN 2-12 grossly intact. Able to wiggle toes. Sensation grossly intact to the distal LLE.   Psychiatric: mentation appears normal and affect normal/bright     XR Left ankle and foot: No evidence of fracture or dislocation per my read.

## 2021-09-07 NOTE — PATIENT INSTRUCTIONS
The majority of swelling comes in the first 48 hours after an injury.  You can reduce the effects of this by applying cold to the injury immediately after an injury and for at least 20 minutes of every hour as able.  Rest and elevation of the injured area (if possible) and anti-inflammatory medications such as ibuprofen or naproxen will also be helpful during this time.    As a general rule, the body heals itself in response to the forces that are applied to it.  In other words, if you just rest, you'll never fully recover. After the initial rest period, gently moving the injured joint (if appropriate) will also help speed ultimate recovery.  If injuries are mild to moderate, you can progress to full range of motion without resistance.  As this becomes easier and more comfortable over the course of days, this area can then begin to be tested carefully with controlled weight-bearing or resistance activities.    Start wearing an ankle brace. If not improved enough or too painful, please let us know because we can fit you for a walking boot.     If you have additional questions about specific rehabilitation over time, please contact me.

## 2021-09-19 ENCOUNTER — HEALTH MAINTENANCE LETTER (OUTPATIENT)
Age: 41
End: 2021-09-19

## 2022-04-30 ENCOUNTER — HEALTH MAINTENANCE LETTER (OUTPATIENT)
Age: 42
End: 2022-04-30

## 2022-11-20 ENCOUNTER — HEALTH MAINTENANCE LETTER (OUTPATIENT)
Age: 42
End: 2022-11-20

## 2023-06-02 ENCOUNTER — HEALTH MAINTENANCE LETTER (OUTPATIENT)
Age: 43
End: 2023-06-02

## 2024-02-03 ENCOUNTER — HEALTH MAINTENANCE LETTER (OUTPATIENT)
Age: 44
End: 2024-02-03

## 2024-06-22 ENCOUNTER — HEALTH MAINTENANCE LETTER (OUTPATIENT)
Age: 44
End: 2024-06-22

## (undated) RX ORDER — LIDOCAINE HCL/EPINEPHRINE/PF 2%-1:200K
VIAL (ML) INJECTION
Status: DISPENSED
Start: 2017-01-23